# Patient Record
Sex: FEMALE | Race: BLACK OR AFRICAN AMERICAN | NOT HISPANIC OR LATINO | Employment: PART TIME | URBAN - METROPOLITAN AREA
[De-identification: names, ages, dates, MRNs, and addresses within clinical notes are randomized per-mention and may not be internally consistent; named-entity substitution may affect disease eponyms.]

---

## 2021-01-13 ENCOUNTER — TELEPHONE (OUTPATIENT)
Dept: URGENT CARE | Facility: CLINIC | Age: 41
End: 2021-01-13

## 2021-01-13 ENCOUNTER — OFFICE VISIT (OUTPATIENT)
Dept: URGENT CARE | Facility: CLINIC | Age: 41
End: 2021-01-13
Payer: MEDICAID

## 2021-01-13 VITALS
HEART RATE: 82 BPM | WEIGHT: 257 LBS | RESPIRATION RATE: 18 BRPM | TEMPERATURE: 98 F | HEIGHT: 65 IN | SYSTOLIC BLOOD PRESSURE: 145 MMHG | OXYGEN SATURATION: 98 % | BODY MASS INDEX: 42.82 KG/M2 | DIASTOLIC BLOOD PRESSURE: 82 MMHG

## 2021-01-13 DIAGNOSIS — R52 BODY ACHES: ICD-10-CM

## 2021-01-13 DIAGNOSIS — M54.50 ACUTE BILATERAL LOW BACK PAIN WITHOUT SCIATICA: Primary | ICD-10-CM

## 2021-01-13 DIAGNOSIS — R82.71 BACTERIURIA: ICD-10-CM

## 2021-01-13 LAB
BILIRUB UR QL STRIP: NEGATIVE
CTP QC/QA: YES
GLUCOSE UR QL STRIP: NEGATIVE
KETONES UR QL STRIP: POSITIVE
LEUKOCYTE ESTERASE UR QL STRIP: POSITIVE
PH, POC UA: 5.5 (ref 5–8)
POC BLOOD, URINE: NEGATIVE
POC NITRATES, URINE: NEGATIVE
PROT UR QL STRIP: NEGATIVE
SARS-COV-2 RDRP RESP QL NAA+PROBE: NEGATIVE
SP GR UR STRIP: 1.03 (ref 1–1.03)
UROBILINOGEN UR STRIP-ACNC: ABNORMAL (ref 0.1–1.1)

## 2021-01-13 PROCEDURE — 87086 URINE CULTURE/COLONY COUNT: CPT

## 2021-01-13 PROCEDURE — 81003 URINALYSIS AUTO W/O SCOPE: CPT | Mod: QW,S$GLB,, | Performed by: NURSE PRACTITIONER

## 2021-01-13 PROCEDURE — U0002: ICD-10-PCS | Mod: QW,S$GLB,, | Performed by: NURSE PRACTITIONER

## 2021-01-13 PROCEDURE — 99203 PR OFFICE/OUTPT VISIT, NEW, LEVL III, 30-44 MIN: ICD-10-PCS | Mod: 25,S$GLB,CS, | Performed by: NURSE PRACTITIONER

## 2021-01-13 PROCEDURE — 99203 OFFICE O/P NEW LOW 30 MIN: CPT | Mod: 25,S$GLB,CS, | Performed by: NURSE PRACTITIONER

## 2021-01-13 PROCEDURE — U0002 COVID-19 LAB TEST NON-CDC: HCPCS | Mod: QW,S$GLB,, | Performed by: NURSE PRACTITIONER

## 2021-01-13 PROCEDURE — 81003 POCT URINALYSIS, DIPSTICK, AUTOMATED, W/O SCOPE: ICD-10-PCS | Mod: QW,S$GLB,, | Performed by: NURSE PRACTITIONER

## 2021-01-13 RX ORDER — METFORMIN HYDROCHLORIDE 1000 MG/1
1000 TABLET, FILM COATED, EXTENDED RELEASE ORAL
COMMUNITY
End: 2022-05-28

## 2021-01-13 RX ORDER — KETOROLAC TROMETHAMINE 30 MG/ML
30 INJECTION, SOLUTION INTRAMUSCULAR; INTRAVENOUS
Status: COMPLETED | OUTPATIENT
Start: 2021-01-13 | End: 2021-01-13

## 2021-01-13 RX ORDER — NAPROXEN 500 MG/1
500 TABLET ORAL 2 TIMES DAILY WITH MEALS
Qty: 20 TABLET | Refills: 0 | Status: SHIPPED | OUTPATIENT
Start: 2021-01-13 | End: 2021-01-23

## 2021-01-13 RX ADMIN — KETOROLAC TROMETHAMINE 30 MG: 30 INJECTION, SOLUTION INTRAMUSCULAR; INTRAVENOUS at 02:01

## 2021-01-15 LAB — BACTERIA UR CULT: NO GROWTH

## 2022-03-31 ENCOUNTER — HOSPITAL ENCOUNTER (EMERGENCY)
Facility: HOSPITAL | Age: 42
Discharge: HOME | End: 2022-03-31
Attending: EMERGENCY MEDICINE
Payer: MEDICAID

## 2022-03-31 VITALS
SYSTOLIC BLOOD PRESSURE: 127 MMHG | RESPIRATION RATE: 12 BRPM | WEIGHT: 193.69 LBS | TEMPERATURE: 98 F | BODY MASS INDEX: 32.27 KG/M2 | HEIGHT: 65 IN | DIASTOLIC BLOOD PRESSURE: 60 MMHG | HEART RATE: 65 BPM | OXYGEN SATURATION: 100 %

## 2022-03-31 DIAGNOSIS — S16.1XXA STRAIN OF NECK MUSCLE, INITIAL ENCOUNTER: ICD-10-CM

## 2022-03-31 DIAGNOSIS — M62.838 MUSCLE SPASMS OF NECK: ICD-10-CM

## 2022-03-31 DIAGNOSIS — M54.2 NECK PAIN: Primary | ICD-10-CM

## 2022-03-31 PROCEDURE — 96372 THER/PROPH/DIAG INJ SC/IM: CPT | Performed by: EMERGENCY MEDICINE

## 2022-03-31 PROCEDURE — 63600175 PHARM REV CODE 636 W HCPCS: Performed by: EMERGENCY MEDICINE

## 2022-03-31 PROCEDURE — 99284 EMERGENCY DEPT VISIT MOD MDM: CPT | Mod: 25

## 2022-03-31 RX ORDER — OXYCODONE AND ACETAMINOPHEN 5; 325 MG/1; MG/1
1 TABLET ORAL EVERY 4 HOURS PRN
Qty: 20 TABLET | Refills: 0 | Status: SHIPPED | OUTPATIENT
Start: 2022-03-31 | End: 2022-04-05

## 2022-03-31 RX ORDER — KETOROLAC TROMETHAMINE 30 MG/ML
15 INJECTION, SOLUTION INTRAMUSCULAR; INTRAVENOUS
Status: COMPLETED | OUTPATIENT
Start: 2022-03-31 | End: 2022-03-31

## 2022-03-31 RX ORDER — OXYCODONE AND ACETAMINOPHEN 5; 325 MG/1; MG/1
1 TABLET ORAL EVERY 4 HOURS PRN
Qty: 20 TABLET | Refills: 0 | Status: SHIPPED | OUTPATIENT
Start: 2022-03-31 | End: 2022-03-31 | Stop reason: SDUPTHER

## 2022-03-31 RX ORDER — ORPHENADRINE CITRATE 30 MG/ML
60 INJECTION INTRAMUSCULAR; INTRAVENOUS
Status: COMPLETED | OUTPATIENT
Start: 2022-03-31 | End: 2022-03-31

## 2022-03-31 RX ADMIN — ORPHENADRINE CITRATE 60 MG: 30 INJECTION INTRAMUSCULAR; INTRAVENOUS at 07:03

## 2022-03-31 RX ADMIN — KETOROLAC TROMETHAMINE 15 MG: 30 INJECTION, SOLUTION INTRAMUSCULAR at 07:03

## 2022-03-31 NOTE — Clinical Note
"Thomas Mcclain"Jose A was seen and treated in our emergency department on 3/31/2022.  She may return to work on 04/01/2022.       If you have any questions or concerns, please don't hesitate to call.      Mahad Silva MD"

## 2022-03-31 NOTE — ED PROVIDER NOTES
SCRIBE #1 NOTE: I, Mamadou Perez, am scribing for, and in the presence of, Mahad Silva MD. I have scribed the entire note.      History     Chief Complaint   Patient presents with    Neck Pain     C/o neck/upper shoulder billat/upper back pain x's 2 weeks and states wants eval bc it is getting worse.      Review of patient's allergies indicates:   Allergen Reactions    Fioricet [butalbital-acetaminophen-caff] Other (See Comments)         History of Present Illness     HPI    3/31/2022, 6:23 AM  History obtained from the patient      History of Present Illness: Thomas Naranjo is a 42 y.o. female patient who presents to the Emergency Department for evaluation of neck pain which onset gradually 2 weeks ago. Pt reports that she has been experiencing bilateral shoulder and upper back pain in addition to neck pain. Her symptoms have been worsening since onset. Symptoms are constant and moderate in severity. No mitigating or exacerbating factors reported. No associated symptoms reported. Patient denies any fever, chills, N/V, abdominal pain, dizziness, weakness, numbness, and all other sxs at this time. No prior Tx reported. No further complaints or concerns at this time.      Arrival mode: Personal vehicle    PCP: Aliza Carbajal MD        Past Medical History:  Past Medical History:   Diagnosis Date    Herpes genitalia     Migraine headache     PCOS (polycystic ovarian syndrome)     Shingles        Past Surgical History:  Past Surgical History:   Procedure Laterality Date    ADENOIDECTOMY      FOOT SURGERY      TONSILLECTOMY           Family History:  Family History   Problem Relation Age of Onset    Colon cancer Neg Hx     Ovarian cancer Neg Hx     Breast cancer Other        Social History:  Social History     Tobacco Use    Smoking status: Never Smoker    Smokeless tobacco: Never Used   Substance and Sexual Activity    Alcohol use: Yes     Comment: occasionally    Drug use: No    Sexual  activity: Not on file        Review of Systems     Review of Systems   Constitutional: Negative for fever.   HENT: Negative for congestion and sore throat.    Respiratory: Negative for cough and shortness of breath.    Cardiovascular: Negative for chest pain.   Gastrointestinal: Negative for abdominal pain, diarrhea, nausea and vomiting.   Genitourinary: Negative for dysuria.   Musculoskeletal: Positive for back pain (upper) and neck pain.        (+) bilateral shoulder pain   Skin: Negative for rash.   Neurological: Negative for dizziness, weakness and numbness.   Hematological: Does not bruise/bleed easily.   All other systems reviewed and are negative.     Physical Exam     Initial Vitals [03/31/22 0536]   BP Pulse Resp Temp SpO2   127/60 65 12 98.1 °F (36.7 °C) 100 %      MAP       --          Physical Exam  Nursing Notes and Vital Signs Reviewed.  Constitutional: Patient is in no acute distress. Well-developed and well-nourished.  Head: Atraumatic. Normocephalic.  Eyes: EOM intact. Conjunctivae are not pale. No scleral icterus.  ENT: Mucous membranes are moist. Oropharynx is clear and symmetric.    Neck: Supple. Full ROM. No lymphadenopathy.  Cardiovascular: Regular rate. Regular rhythm. No murmurs, rubs, or gallops. Distal pulses are 2+ and symmetric.  Pulmonary/Chest: No respiratory distress. Clear to auscultation bilaterally. No wheezing or rales.  Abdominal: Soft and non-distended.  There is no tenderness.  No rebound, guarding, or rigidity.  Musculoskeletal: Moves all extremities. No obvious deformities. No edema.  Skin: Warm and dry.  Neurological:  Alert, awake, and appropriate.  Normal speech.  No acute focal neurological deficits are appreciated.  Psychiatric: Normal affect. Good eye contact. Appropriate in content.     ED Course   Procedures  ED Vital Signs:  Vitals:    03/31/22 0536   BP: 127/60   Pulse: 65   Resp: 12   Temp: 98.1 °F (36.7 °C)   TempSrc: Oral   SpO2: 100%   Weight: 87.9 kg (193 lb  "10.8 oz)   Height: 5' 5" (1.651 m)     Imaging Results:  Imaging Results          X-Ray Thoracic Spine AP Lateral (Final result)  Result time 03/31/22 08:55:42    Final result by Asael Taylor MD (03/31/22 08:55:42)                 Impression:      Minimal thoracic scoliosis, convex to the left, likely positional.  Otherwise negative thoracic spine exam.      Electronically signed by: Asael Taylor  Date:    03/31/2022  Time:    08:55             Narrative:    EXAMINATION:  XR THORACIC SPINE AP LATERAL    CLINICAL HISTORY:  Pain Thoracic Spine (724.1);    COMPARISON:  None    FINDINGS:  Minimal thoracic scoliosis, convex to the left which may be positional.All thoracic vertebral bodies are normal in height.No significant degenerative changes.Paravertebral soft tissues are normal.                               X-Ray Cervical Spine AP And Lateral (Final result)  Result time 03/31/22 08:57:59    Final result by Asael Taylor MD (03/31/22 08:57:59)                 Impression:      1. Minimal anterior subluxation C4-C5.  2. Minimal disc space narrowing C4-5 and C5-6.      Electronically signed by: Asael Taylor  Date:    03/31/2022  Time:    08:57             Narrative:    EXAMINATION:  XR CERVICAL SPINE AP LATERAL    CLINICAL HISTORY:  Pain cervical (723.1);    COMPARISON:  None.    FINDINGS:  Tip of the odontoid obscured by overlapping structures.  1.2 mm anterior subluxation C4-C5.  Otherwise alignment normal otherwise the alignment is normal.  All cervical vertebral bodies are normal in height.  Minimal disc space narrowing C4-5 and C5-6.  Prevertebral soft tissues are normal.                                      The Emergency Provider reviewed the vital signs and test results, which are outlined above.     ED Discussion     9:09 AM: Reassessed pt at this time. Discussed with pt all pertinent ED information and results. Discussed pt dx and plan of tx. Gave pt all f/u and return to the ED " instructions. All questions and concerns were addressed at this time. Pt expresses understanding of information and instructions, and is comfortable with plan to discharge. Pt is stable for discharge.    I discussed with patient and/or family/caretaker that evaluation in the ED does not suggest any emergent or life threatening medical conditions requiring immediate intervention beyond what was provided in the ED, and I believe patient is safe for discharge.  Regardless, an unremarkable evaluation in the ED does not preclude the development or presence of a serious of life threatening condition. As such, patient was instructed to return immediately for any worsening or change in current symptoms.       Medical Decision Making:   Clinical Tests:   Radiological Study: Ordered and Reviewed           ED Medication(s):  Medications   ketorolac injection 15 mg (15 mg Intramuscular Given 3/31/22 0716)   orphenadrine injection 60 mg (60 mg Intramuscular Given 3/31/22 0711)       New Prescriptions    OXYCODONE-ACETAMINOPHEN (PERCOCET) 5-325 MG PER TABLET    Take 1 tablet by mouth every 4 (four) hours as needed for Pain.        Follow-up Information     Schedule an appointment as soon as possible for a visit  with The [address]    Specialty: Internal Medicine  Contact information:  [address] [facility] [address] [phone]  Additional information:  Please park on the [address] side and use the Clinic entrance. Check in on the 2nd floor, to the right.                           Scribe Attestation:   Scribe #1: I performed the above scribed service and the documentation accurately describes the services I performed. I attest to the accuracy of the note.     Attending:   Physician Attestation Statement for Scribe #1: I, Mahad Silva MD, personally performed the services described in this documentation, as scribed by Mamadou Perez, in my presence, and it is both accurate and  complete.          Clinical Impression       ICD-10-CM ICD-9-CM   1. Neck pain  M54.2 723.1   2. Strain of neck muscle, initial encounter  S16.1XXA 847.0   3. Muscle spasms of neck  M62.838 728.85       Disposition:   Disposition: Discharged  Condition: Stable       Mahad Silva MD  03/31/22 0959

## 2022-04-07 ENCOUNTER — TELEPHONE (OUTPATIENT)
Dept: ORTHOPEDICS | Facility: CLINIC | Age: 42
End: 2022-04-07
Payer: MEDICAID

## 2022-04-07 NOTE — TELEPHONE ENCOUNTER
----- Message from Mary Umana LPN sent at 4/7/2022  2:53 PM CDT -----  Contact: Thomas    ----- Message -----  From: Gaye Keating  Sent: 4/7/2022   2:35 PM CDT  To: [facility] Clinical [facility]    Pt was seen in ER and was advised to follow up in clinic for spine. Please call her back at [phone].            Thanks  DD

## 2022-04-07 NOTE — TELEPHONE ENCOUNTER
Spoke with patient and informed her that our Ortho Department does not treat the neck and back and that [facility] is not accepting new Medicaid patient's at this time. Understanding verbalized.

## 2022-04-11 ENCOUNTER — OFFICE VISIT (OUTPATIENT)
Dept: PRIMARY CARE CLINIC | Facility: CLINIC | Age: 42
End: 2022-04-11
Payer: MEDICAID

## 2022-04-11 VITALS
SYSTOLIC BLOOD PRESSURE: 120 MMHG | HEART RATE: 77 BPM | DIASTOLIC BLOOD PRESSURE: 65 MMHG | WEIGHT: 201 LBS | OXYGEN SATURATION: 100 % | TEMPERATURE: 98 F | BODY MASS INDEX: 33.49 KG/M2 | HEIGHT: 65 IN

## 2022-04-11 DIAGNOSIS — M54.2 NECK PAIN: Primary | ICD-10-CM

## 2022-04-11 DIAGNOSIS — M79.602 LEFT ARM PAIN: ICD-10-CM

## 2022-04-11 PROCEDURE — 1159F MED LIST DOCD IN RCRD: CPT | Mod: CPTII,,, | Performed by: FAMILY MEDICINE

## 2022-04-11 PROCEDURE — 99999 PR PBB SHADOW E&M-EST. PATIENT-LVL V: ICD-10-PCS | Mod: PBBFAC,,, | Performed by: FAMILY MEDICINE

## 2022-04-11 PROCEDURE — 99203 PR OFFICE/OUTPT VISIT, NEW, LEVL III, 30-44 MIN: ICD-10-PCS | Mod: S$PBB,,, | Performed by: FAMILY MEDICINE

## 2022-04-11 PROCEDURE — 99999 PR PBB SHADOW E&M-EST. PATIENT-LVL V: CPT | Mod: PBBFAC,,, | Performed by: FAMILY MEDICINE

## 2022-04-11 PROCEDURE — 3074F SYST BP LT 130 MM HG: CPT | Mod: CPTII,,, | Performed by: FAMILY MEDICINE

## 2022-04-11 PROCEDURE — 1159F PR MEDICATION LIST DOCUMENTED IN MEDICAL RECORD: ICD-10-PCS | Mod: CPTII,,, | Performed by: FAMILY MEDICINE

## 2022-04-11 PROCEDURE — 99215 OFFICE O/P EST HI 40 MIN: CPT | Mod: PBBFAC,PN | Performed by: FAMILY MEDICINE

## 2022-04-11 PROCEDURE — 3074F PR MOST RECENT SYSTOLIC BLOOD PRESSURE < 130 MM HG: ICD-10-PCS | Mod: CPTII,,, | Performed by: FAMILY MEDICINE

## 2022-04-11 PROCEDURE — 99203 OFFICE O/P NEW LOW 30 MIN: CPT | Mod: S$PBB,,, | Performed by: FAMILY MEDICINE

## 2022-04-11 PROCEDURE — 3078F DIAST BP <80 MM HG: CPT | Mod: CPTII,,, | Performed by: FAMILY MEDICINE

## 2022-04-11 PROCEDURE — 3078F PR MOST RECENT DIASTOLIC BLOOD PRESSURE < 80 MM HG: ICD-10-PCS | Mod: CPTII,,, | Performed by: FAMILY MEDICINE

## 2022-04-11 PROCEDURE — 3008F PR BODY MASS INDEX (BMI) DOCUMENTED: ICD-10-PCS | Mod: CPTII,,, | Performed by: FAMILY MEDICINE

## 2022-04-11 PROCEDURE — 3008F BODY MASS INDEX DOCD: CPT | Mod: CPTII,,, | Performed by: FAMILY MEDICINE

## 2022-04-11 NOTE — PROGRESS NOTES
Subjective:       Patient ID: Thomas Naranjo is a 42 y.o. female.    Chief Complaint: Left neck/ arm pain (requesting referral))      HPI   History of Present Illness:   Thomas Naranjo 42 y.o. female presents today with   She has PCP in [place], but is here to work, was seen in ER 3/31 for neck and left arm pain.  42 y.o. female patient who presents to the Emergency Department for evaluation of neck pain which onset gradually 2 weeks ago. Pt reports that she has been experiencing bilateral shoulder and upper back pain in addition to neck pain. Her symptoms have been worsening since onset. Symptoms are constant and moderate in severity. No mitigating or exacerbating factors reported. No associated symptoms reported. Patient denies any fever, chills, N/V, abdominal pain, dizziness, weakness, numbness, and all other sxs at this time. Xray of the cervix and thoracic have been reviewed-mild osteoarthritis and scoliosis.    She has tried to see ortho but unable to get in due to insurance. Here today to get referral to [facility]. She is due to start PT on 04/13 as ordered by ER.   She is not interested in shoulder or neck injections.   Past Medical History:   Diagnosis Date    Herpes genitalia     Migraine headache     PCOS (polycystic ovarian syndrome)     Shingles      Family History   Problem Relation Age of Onset    Colon cancer Neg Hx     Ovarian cancer Neg Hx     Breast cancer Other      Social History     Socioeconomic History    Marital status: Single   Tobacco Use    Smoking status: Never Smoker    Smokeless tobacco: Never Used   Substance and Sexual Activity    Alcohol use: Yes     Comment: occasionally    Drug use: No     Outpatient Encounter Medications as of 4/11/2022   Medication Sig Dispense Refill    ondansetron (ZOFRAN-ODT) 4 MG TbDL Take 1 tablet (4 mg total) by mouth every 8 (eight) hours as needed (nausea/vomiting). 9 tablet 0    acyclovir 5% (ZOVIRAX) 5 % ointment Apply topically 6 (six)  "times daily. (Patient not taking: Reported on 4/11/2022) 30 g 0    desogestrel-ethinyl estradiol (APRI) 0.15-30 mg-mcg per tablet Take 1 tablet by mouth once daily. (Patient not taking: Reported on 4/11/2022) 28 tablet 11    imiquimod (ALDARA) 5 % cream Apply topically 3 (three) times a week. (Patient not taking: Reported on 4/11/2022) 24 packet 1    metFORMIN (GLUMETZA) 1000 MG (MOD) 24hr tablet Take 1,000 mg by mouth daily with breakfast.      spironolactone-hydrochlorothiazide (ALDACTAZIDE) 50-50 mg per tablet Take 1 tablet by mouth once daily.      tramadol (ULTRAM) 50 mg tablet Take 1 tablet (50 mg total) by mouth every 6 (six) hours as needed for Pain. (Patient not taking: Reported on 1/13/2021) 12 tablet 0    TROKENDI XR 50 mg Cp24 Take 1 tablet by mouth daily as needed.        No facility-administered encounter medications on file as of 4/11/2022.       Review of Systems   Constitutional: Negative for chills and fever.   HENT: Negative for congestion and facial swelling.    Eyes: Negative for discharge and itching.   Respiratory: Negative for cough and wheezing.    Cardiovascular: Negative for chest pain and palpitations.   Gastrointestinal: Negative for abdominal pain, nausea and vomiting.   Endocrine: Negative for cold intolerance and heat intolerance.   Genitourinary: Negative for dysuria and flank pain.   Musculoskeletal: Negative for myalgias and neck stiffness.   Skin: Negative for pallor and wound.   Neurological: Negative for facial asymmetry and weakness.   Psychiatric/Behavioral: Negative for agitation and suicidal ideas.       Objective:      /65 (BP Location: Right arm, Patient Position: Sitting, BP Method: Medium (Automatic))   Pulse 77   Temp 97.9 °F (36.6 °C) (Temporal)   Ht 5' 5" (1.651 m)   Wt 91.2 kg (201 lb)   SpO2 100%   BMI 33.45 kg/m²   Physical Exam    CONSTITUTIONAL: No apparent distress.   CARDIOVASCULAR: No perioral cyanosis  PULMONARY: Breathing unlabored. No " retractions Chest expansion grossly normal.  PSYCHIATRIC: Alert and conversant and grossly oriented. Mood is grossly neutral. Affect appropriate.   NEUROLOGIC: No focal sensory deficits reported.   Results for orders placed or performed in visit on 01/13/21   Urine culture    Specimen: Urine, Clean Catch   Result Value Ref Range    Urine Culture, Routine No growth    POCT COVID-19 Rapid Screening   Result Value Ref Range    POC Rapid COVID Negative Negative     Acceptable Yes    POCT Urinalysis, Dipstick, Automated, W/O Scope   Result Value Ref Range    POC Blood, Urine Negative Negative    POC Bilirubin, Urine Negative Negative    POC Urobilinogen, Urine norm 0.1 - 1.1    POC Ketones, Urine Positive (A) Negative    POC Protein, Urine Negative Negative    POC Nitrates, Urine Negative Negative    POC Glucose, Urine Negative Negative    pH, UA 5.5 5 - 8    POC Specific Gravity, Urine 1.030 (A) 1.003 - 1.029    POC Leukocytes, Urine Positive (A) Negative     Assessment:       1. Neck pain    2. Left arm pain        Plan:   Neck pain  -     Ambulatory referral/consult to Orthopedics; Future; Expected date: 04/18/2022    Left arm pain  -     Ambulatory referral/consult to Orthopedics; Future; Expected date: 04/18/2022      Reassured, No urgent need per xray and sxs has not worsened. Advised to continue med as ordered and start PT while waiting for her referral to come through and if any sxs, ok to RTC clinic. Ortho referral as requested.  Sachi Wiggins MD

## 2022-05-28 ENCOUNTER — OFFICE VISIT (OUTPATIENT)
Dept: URGENT CARE | Facility: CLINIC | Age: 42
End: 2022-05-28
Payer: MEDICAID

## 2022-05-28 VITALS
OXYGEN SATURATION: 97 % | SYSTOLIC BLOOD PRESSURE: 122 MMHG | BODY MASS INDEX: 33.49 KG/M2 | DIASTOLIC BLOOD PRESSURE: 80 MMHG | HEART RATE: 97 BPM | HEIGHT: 65 IN | RESPIRATION RATE: 17 BRPM | WEIGHT: 201 LBS | TEMPERATURE: 101 F

## 2022-05-28 DIAGNOSIS — U07.1 COVID-19: Primary | ICD-10-CM

## 2022-05-28 DIAGNOSIS — Z11.52 ENCOUNTER FOR SCREENING LABORATORY TESTING FOR COVID-19 VIRUS: ICD-10-CM

## 2022-05-28 DIAGNOSIS — R05.9 COUGH: ICD-10-CM

## 2022-05-28 LAB
CTP QC/QA: YES
CTP QC/QA: YES
POC MOLECULAR INFLUENZA A AGN: NEGATIVE
POC MOLECULAR INFLUENZA B AGN: NEGATIVE
SARS-COV-2 RDRP RESP QL NAA+PROBE: POSITIVE

## 2022-05-28 PROCEDURE — 3079F DIAST BP 80-89 MM HG: CPT | Mod: CPTII,S$GLB,,

## 2022-05-28 PROCEDURE — 3074F SYST BP LT 130 MM HG: CPT | Mod: CPTII,S$GLB,,

## 2022-05-28 PROCEDURE — 87502 INFLUENZA DNA AMP PROBE: CPT | Mod: QW,S$GLB,,

## 2022-05-28 PROCEDURE — 99213 OFFICE O/P EST LOW 20 MIN: CPT | Mod: S$GLB,,,

## 2022-05-28 PROCEDURE — 3074F PR MOST RECENT SYSTOLIC BLOOD PRESSURE < 130 MM HG: ICD-10-PCS | Mod: CPTII,S$GLB,,

## 2022-05-28 PROCEDURE — 1159F PR MEDICATION LIST DOCUMENTED IN MEDICAL RECORD: ICD-10-PCS | Mod: CPTII,S$GLB,,

## 2022-05-28 PROCEDURE — 1160F PR REVIEW ALL MEDS BY PRESCRIBER/CLIN PHARMACIST DOCUMENTED: ICD-10-PCS | Mod: CPTII,S$GLB,,

## 2022-05-28 PROCEDURE — U0002 COVID-19 LAB TEST NON-CDC: HCPCS | Mod: QW,S$GLB,,

## 2022-05-28 PROCEDURE — 99213 PR OFFICE/OUTPT VISIT, EST, LEVL III, 20-29 MIN: ICD-10-PCS | Mod: S$GLB,,,

## 2022-05-28 PROCEDURE — 3079F PR MOST RECENT DIASTOLIC BLOOD PRESSURE 80-89 MM HG: ICD-10-PCS | Mod: CPTII,S$GLB,,

## 2022-05-28 PROCEDURE — 3008F BODY MASS INDEX DOCD: CPT | Mod: CPTII,S$GLB,,

## 2022-05-28 PROCEDURE — 3008F PR BODY MASS INDEX (BMI) DOCUMENTED: ICD-10-PCS | Mod: CPTII,S$GLB,,

## 2022-05-28 PROCEDURE — U0002: ICD-10-PCS | Mod: QW,S$GLB,,

## 2022-05-28 PROCEDURE — 1159F MED LIST DOCD IN RCRD: CPT | Mod: CPTII,S$GLB,,

## 2022-05-28 PROCEDURE — 1160F RVW MEDS BY RX/DR IN RCRD: CPT | Mod: CPTII,S$GLB,,

## 2022-05-28 PROCEDURE — 87502 POCT INFLUENZA A/B MOLECULAR: ICD-10-PCS | Mod: QW,S$GLB,,

## 2022-05-28 RX ORDER — AZELASTINE 1 MG/ML
SPRAY, METERED NASAL
COMMUNITY
Start: 2021-08-26

## 2022-05-28 RX ORDER — ERGOCALCIFEROL 1.25 MG/1
50000 CAPSULE ORAL
COMMUNITY
Start: 2022-05-25

## 2022-05-28 RX ORDER — PANTOPRAZOLE SODIUM 40 MG/1
40 TABLET, DELAYED RELEASE ORAL 2 TIMES DAILY
COMMUNITY
Start: 2022-05-21

## 2022-05-28 RX ORDER — ACYCLOVIR 800 MG/1
800 TABLET ORAL 2 TIMES DAILY
COMMUNITY
Start: 2022-05-08

## 2022-05-28 RX ORDER — VALACYCLOVIR HYDROCHLORIDE 1 G/1
1000 TABLET, FILM COATED ORAL DAILY
COMMUNITY
Start: 2022-05-22

## 2022-05-28 RX ORDER — HYDROCODONE BITARTRATE AND ACETAMINOPHEN 7.5; 325 MG/15ML; MG/15ML
SOLUTION ORAL
COMMUNITY
Start: 2021-10-06

## 2022-05-28 RX ORDER — PROMETHAZINE HYDROCHLORIDE AND DEXTROMETHORPHAN HYDROBROMIDE 6.25; 15 MG/5ML; MG/5ML
5 SYRUP ORAL NIGHTLY PRN
Qty: 118 ML | Refills: 0 | Status: SHIPPED | OUTPATIENT
Start: 2022-05-28 | End: 2022-06-07

## 2022-05-28 RX ORDER — FAMCICLOVIR 250 MG/1
250 TABLET ORAL 2 TIMES DAILY
COMMUNITY
Start: 2022-05-22

## 2022-05-28 RX ORDER — FLUTICASONE PROPIONATE 50 MCG
SPRAY, SUSPENSION (ML) NASAL
COMMUNITY
Start: 2021-08-26

## 2022-05-28 RX ORDER — CLINDAMYCIN PHOSPHATE 11.9 MG/ML
SOLUTION TOPICAL
COMMUNITY
Start: 2021-08-13

## 2022-05-28 RX ORDER — MULTIVIT WITH IRON,MINERALS
2 TABLET,CHEWABLE ORAL
COMMUNITY

## 2022-05-28 NOTE — PROGRESS NOTES
"Subjective:       Patient ID: Thomas Naranjo is a 42 y.o. female.    Vitals:  height is 5' 5" (1.651 m) and weight is 91.2 kg (201 lb). Her oral temperature is 101 °F (38.3 °C) (abnormal). Her blood pressure is 122/80 and her pulse is 97. Her respiration is 17 and oxygen saturation is 97%.     Chief Complaint: URI    42-year-old female complains of congestion, cough, nausea and body aches has been going on for 2 days.  Patient also has associated fever and chills.  She is taking Tylenol and Cristal-Meacham for symptoms with mild relief.  She denies any chest pain or shortness of breath.  She is vaccinated for COVID with 2 boosters.  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    URI   This is a new problem. Episode onset: 2 days ago. The problem has been gradually worsening. Associated symptoms include congestion, coughing and nausea. Pertinent negatives include no abdominal pain, chest pain, diarrhea, ear pain, sinus pain or vomiting. Associated symptoms comments: Chills, fever. She has tried acetaminophen for the symptoms. The treatment provided mild relief.       Constitution: Positive for chills and fever. Negative for sweating and fatigue.   HENT: Positive for congestion. Negative for ear pain, nosebleeds, foreign body in nose, postnasal drip, sinus pain and sinus pressure.    Cardiovascular: Negative for chest pain and sob on exertion.   Respiratory: Positive for cough. Negative for shortness of breath.    Gastrointestinal: Positive for nausea. Negative for abdominal pain, vomiting, constipation and diarrhea.   Musculoskeletal: Positive for muscle ache.       Objective:      Physical Exam   Constitutional: She is oriented to person, place, and time. She appears well-developed. She is cooperative.  Non-toxic appearance. She does not appear ill. No distress.   HENT:   Head: Normocephalic and atraumatic.   Ears:   Right Ear: Hearing, tympanic membrane, external ear and ear canal normal.   Left Ear: " Hearing, tympanic membrane, external ear and ear canal normal.   Nose: Congestion present. No mucosal edema, rhinorrhea or nasal deformity. No epistaxis. Right sinus exhibits no maxillary sinus tenderness and no frontal sinus tenderness. Left sinus exhibits no maxillary sinus tenderness and no frontal sinus tenderness.   Mouth/Throat: Uvula is midline, oropharynx is clear and moist and mucous membranes are normal. Mucous membranes are moist. No trismus in the jaw. Normal dentition. No uvula swelling. No oropharyngeal exudate or posterior oropharyngeal erythema.   Eyes: Conjunctivae and lids are normal. Right eye exhibits no discharge. Left eye exhibits no discharge. No scleral icterus.   Neck: Trachea normal and phonation normal. Neck supple.   Cardiovascular: Normal rate, regular rhythm, normal heart sounds and normal pulses.   Pulmonary/Chest: Effort normal and breath sounds normal. No stridor. No respiratory distress. She has no wheezes. She has no rhonchi. She has no rales.   Abdominal: Normal appearance and bowel sounds are normal. She exhibits no distension and no mass. Soft. There is no abdominal tenderness.   Musculoskeletal: Normal range of motion.         General: No deformity. Normal range of motion.   Neurological: She is alert and oriented to person, place, and time. She exhibits normal muscle tone. Coordination normal.   Skin: Skin is warm, dry, intact, not diaphoretic and not pale.   Psychiatric: Her speech is normal and behavior is normal. Judgment and thought content normal.   Nursing note and vitals reviewed.        Results for orders placed or performed in visit on 05/28/22   POCT COVID-19 Rapid Screening   Result Value Ref Range    POC Rapid COVID Positive (A) Negative     Acceptable Yes    POCT Influenza A/B MOLECULAR   Result Value Ref Range    POC Molecular Influenza A Ag Negative Negative, Not Reported    POC Molecular Influenza B Ag Negative Negative, Not Reported    Quality  Control Acceptable Yes        Assessment:       1. COVID-19    2. Encounter for screening laboratory testing for COVID-19 virus    3. Cough          Plan:       Risk of complication score:1; no referral needed.     Discussed results with patient and proper quarantine based on CDC guidelines.   Discussed use of OTC medications for symptom control as this is a viral disease.   All ER precautions covered including but not limited to shortness of breath, intractable fever, or chest pain.  Discussed RTC if symptoms worsen, change, or persist.   Patient verbalized understanding and agreed with the plan.     COVID-19  -     promethazine-dextromethorphan (PROMETHAZINE-DM) 6.25-15 mg/5 mL Syrp; Take 5 mLs by mouth nightly as needed (cough). Do not take maximum of 30mLs in 24hrs  Dispense: 118 mL; Refill: 0    Encounter for screening laboratory testing for COVID-19 virus  -     POCT COVID-19 Rapid Screening    Cough  -     POCT Influenza A/B MOLECULAR  -     promethazine-dextromethorphan (PROMETHAZINE-DM) 6.25-15 mg/5 mL Syrp; Take 5 mLs by mouth nightly as needed (cough). Do not take maximum of 30mLs in 24hrs  Dispense: 118 mL; Refill: 0

## 2022-05-28 NOTE — LETTER
[address] [facility], [address] [address]  Phone: [phone]  Fax: [phone]          Return to Work/School    Patient: Thomas Naranjo  YOB: 1980   Date: 05/28/2022     To Whom It May Concern:     Thomas Naranjo was in contact with/seen in my office on 05/28/2022. COVID-19 is present in our communities across the state. There is limited testing for COVID at this time, so not all patients can be tested. In this situation, your employee meets the following criteria:     Thomas Naranjo has met the criteria for COVID-19 testing and has a POSITIVE result. She can return to work once they are asymptomatic for 24 hours without the use of fever reducing medications AND at least five days from the start of symptoms (or from the first positive result if they have no symptoms).      If you have any questions or concerns, or if I can be of further assistance, please do not hesitate to contact me.     Sincerely,    Yenny Galvan PA-C

## 2022-06-08 ENCOUNTER — PATIENT MESSAGE (OUTPATIENT)
Dept: RESEARCH | Facility: HOSPITAL | Age: 42
End: 2022-06-08
Payer: MEDICAID

## 2022-07-13 ENCOUNTER — TELEPHONE (OUTPATIENT)
Dept: ORTHOPEDICS | Facility: CLINIC | Age: 42
End: 2022-07-13
Payer: MEDICAID

## 2023-01-01 ENCOUNTER — OFFICE VISIT (OUTPATIENT)
Dept: URGENT CARE | Facility: CLINIC | Age: 43
End: 2023-01-01
Payer: COMMERCIAL

## 2023-01-01 VITALS
RESPIRATION RATE: 18 BRPM | WEIGHT: 195.38 LBS | DIASTOLIC BLOOD PRESSURE: 81 MMHG | HEART RATE: 98 BPM | TEMPERATURE: 99 F | SYSTOLIC BLOOD PRESSURE: 115 MMHG | HEIGHT: 65 IN | BODY MASS INDEX: 32.55 KG/M2 | OXYGEN SATURATION: 99 %

## 2023-01-01 DIAGNOSIS — J10.1 INFLUENZA A: Primary | ICD-10-CM

## 2023-01-01 LAB
CTP QC/QA: YES
CTP QC/QA: YES
POC MOLECULAR INFLUENZA A AGN: POSITIVE
POC MOLECULAR INFLUENZA B AGN: NEGATIVE
SARS-COV-2 AG RESP QL IA.RAPID: NEGATIVE

## 2023-01-01 PROCEDURE — 1160F RVW MEDS BY RX/DR IN RCRD: CPT | Mod: CPTII,S$GLB,,

## 2023-01-01 PROCEDURE — 3008F BODY MASS INDEX DOCD: CPT | Mod: CPTII,S$GLB,,

## 2023-01-01 PROCEDURE — 3079F PR MOST RECENT DIASTOLIC BLOOD PRESSURE 80-89 MM HG: ICD-10-PCS | Mod: CPTII,S$GLB,,

## 2023-01-01 PROCEDURE — 3074F SYST BP LT 130 MM HG: CPT | Mod: CPTII,S$GLB,,

## 2023-01-01 PROCEDURE — 87502 INFLUENZA DNA AMP PROBE: CPT | Mod: QW,S$GLB,,

## 2023-01-01 PROCEDURE — 3008F PR BODY MASS INDEX (BMI) DOCUMENTED: ICD-10-PCS | Mod: CPTII,S$GLB,,

## 2023-01-01 PROCEDURE — 1159F PR MEDICATION LIST DOCUMENTED IN MEDICAL RECORD: ICD-10-PCS | Mod: CPTII,S$GLB,,

## 2023-01-01 PROCEDURE — 1159F MED LIST DOCD IN RCRD: CPT | Mod: CPTII,S$GLB,,

## 2023-01-01 PROCEDURE — 99213 PR OFFICE/OUTPT VISIT, EST, LEVL III, 20-29 MIN: ICD-10-PCS | Mod: S$GLB,,,

## 2023-01-01 PROCEDURE — 87502 POCT INFLUENZA A/B MOLECULAR: ICD-10-PCS | Mod: QW,S$GLB,,

## 2023-01-01 PROCEDURE — 99213 OFFICE O/P EST LOW 20 MIN: CPT | Mod: S$GLB,,,

## 2023-01-01 PROCEDURE — 1160F PR REVIEW ALL MEDS BY PRESCRIBER/CLIN PHARMACIST DOCUMENTED: ICD-10-PCS | Mod: CPTII,S$GLB,,

## 2023-01-01 PROCEDURE — 3079F DIAST BP 80-89 MM HG: CPT | Mod: CPTII,S$GLB,,

## 2023-01-01 PROCEDURE — 3074F PR MOST RECENT SYSTOLIC BLOOD PRESSURE < 130 MM HG: ICD-10-PCS | Mod: CPTII,S$GLB,,

## 2023-01-01 PROCEDURE — U0002 SARS CORONAVIRUS 2 ANTIGEN POCT, MANUAL READ: ICD-10-PCS | Mod: QW,S$GLB,,

## 2023-01-01 PROCEDURE — U0002 COVID-19 LAB TEST NON-CDC: HCPCS | Mod: QW,S$GLB,,

## 2023-01-01 RX ORDER — OSELTAMIVIR PHOSPHATE 75 MG/1
75 CAPSULE ORAL 2 TIMES DAILY
Qty: 10 CAPSULE | Refills: 0 | Status: SHIPPED | OUTPATIENT
Start: 2023-01-01 | End: 2023-01-06

## 2023-01-01 NOTE — PATIENT INSTRUCTIONS

## 2023-01-01 NOTE — PROGRESS NOTES
"Subjective:       Patient ID: Thomas Naranjo is a 42 y.o. female.    Vitals:  height is 5' 5" (1.651 m) and weight is 88.6 kg (195 lb 6.4 oz). Her oral temperature is 98.7 °F (37.1 °C). Her blood pressure is 115/81 and her pulse is 98. Her respiration is 18 and oxygen saturation is 99%.     Chief Complaint: Cough    Pt is a 41 y/o F who presents with coughing, sore throat, nasal congestion x3 days. Denies fever, chills, CP, SoB, N/V/D, abdominal pain, dizziness.    Cough  This is a new problem. Episode onset: 3 days ago. The problem has been gradually worsening. The cough is Non-productive. Associated symptoms include postnasal drip and a sore throat. Pertinent negatives include no chest pain, chills, ear congestion, ear pain, fever, headaches, heartburn, hemoptysis, myalgias, nasal congestion, rash, rhinorrhea, shortness of breath, sweats, weight loss or wheezing. Nothing aggravates the symptoms. Treatments tried: Extra strenght Tylenol. The treatment provided mild relief. Her past medical history is significant for bronchitis and pneumonia. There is no history of asthma, bronchiectasis, COPD, emphysema or environmental allergies.     Constitution: Negative for chills and fever.   HENT:  Positive for congestion, postnasal drip and sore throat. Negative for ear pain and ear discharge.    Neck: Negative for neck pain and neck stiffness.   Cardiovascular:  Negative for chest pain.   Respiratory:  Positive for cough. Negative for bloody sputum, shortness of breath and wheezing.    Gastrointestinal:  Negative for abdominal pain, nausea, vomiting, constipation, diarrhea and heartburn.   Genitourinary:  Negative for dysuria.   Musculoskeletal:  Negative for muscle ache.   Skin:  Negative for rash.   Allergic/Immunologic: Negative for environmental allergies and sneezing.   Neurological:  Negative for dizziness and headaches.     Objective:      Physical Exam   Constitutional: She is oriented to person, place, and time. " She appears well-developed.   HENT:   Head: Normocephalic and atraumatic.   Ears:   Right Ear: Tympanic membrane, external ear and ear canal normal.   Left Ear: Tympanic membrane, external ear and ear canal normal.   Nose: Congestion present.   Mouth/Throat: Oropharynx is clear and moist.   Eyes: Conjunctivae, EOM and lids are normal. Pupils are equal, round, and reactive to light.   Neck: Trachea normal and phonation normal. Neck supple.   Cardiovascular: Normal rate, regular rhythm, normal heart sounds and normal pulses.   Pulmonary/Chest: Effort normal and breath sounds normal. No respiratory distress.   Musculoskeletal: Normal range of motion.         General: Normal range of motion.   Neurological: no focal deficit. She is alert and oriented to person, place, and time. No cranial nerve deficit.   Skin: Skin is warm, dry and intact. Capillary refill takes less than 2 seconds.   Psychiatric: Her speech is normal and behavior is normal. Judgment and thought content normal.   Nursing note and vitals reviewed.      Results for orders placed or performed in visit on 01/01/23   SARS Coronavirus 2 Antigen, POCT Manual Read   Result Value Ref Range    SARS Coronavirus 2 Antigen Negative Negative     Acceptable Yes    POCT Influenza A/B MOLECULAR   Result Value Ref Range    POC Molecular Influenza A Ag Positive (A) Negative, Not Reported    POC Molecular Influenza B Ag Negative Negative, Not Reported     Acceptable Yes        Assessment:       1. Influenza A          Plan:         Influenza A  -     SARS Coronavirus 2 Antigen, POCT Manual Read  -     POCT Influenza A/B MOLECULAR  -     oseltamivir (TAMIFLU) 75 MG capsule; Take 1 capsule (75 mg total) by mouth 2 (two) times daily. for 5 days  Dispense: 10 capsule; Refill: 0                   Patient Instructions   - Rest.    - Drink plenty of fluids.  - Viral upper respiratory infections typically run their course in 10-14 days.      - You  can take over-the-counter claritin, zyrtec, allegra, or xyzal as directed. These are antihistamines that can help with runny nose, nasal congestion, sneezing, and helps to dry up post-nasal drip, which usually causes sore throat and cough.              - If you do NOT have high blood pressure, you may use a decongestant form (D)  of this medication (ie. Claritin- D, zyrtec-D, allegra-D) or if you do not take the D form, you can take sudafed (pseudoephedrine) over the counter, which is a decongestant. Do NOT take two decongestant (D) medications at the same time (such as mucinex-D and claritin-D or plain sudafed and claritin D)    - If you DO have Hypertension, anxiety, or palpitations, it is safe to take Coricidin HBP for relief of sinus symptoms.     - You can use Flonase (fluticasone) nasal spray as directed for sinus congestion and postnasal drip. This is a steroid nasal spray that works locally over time to decrease the inflammation in your nose/sinuses and help with allergic symptoms. This is not an quick- relief spray like afrin, but it works well if used daily.  Discontinue if you develop nose bleed  - use nasal saline prior to Flonase.  - Use Ocean Spray Nasal Saline 1-3 puffs each nostril every 2-3 hours then blow out onto tissue. This is to irrigate the nasal passage way to clear the sinus openings. Use until sinus problem resolved.     - you can take plain Mucinex (guaifenesin) 1200 mg twice a day to help loosen mucous.      -warm salt water gargles can help with sore throat     - warm tea with honey can help with cough. Honey is a natural cough suppressant.     - Dextromethorphan (DM) is a cough suppressant over the counter (ie. mucinex DM, robitussin, delsym; dayquil/nyquil has DM as well.)        - Follow up with your PCP or specialty clinic as directed in the next 1-2 weeks if not improved or as needed.  You can call [phone] to schedule an appointment with the appropriate provider.       - Go  to the ER if you develop new or worsening symptoms.      - You must understand that you have received an Urgent Care treatment only and that you may be released before all of your medical problems are known or treated.   - You, the patient, will arrange for follow up care as instructed.   - If your condition worsens or fails to improve we recommend that you receive another evaluation at the ER immediately or contact your PCP to discuss your concerns or return here.

## 2023-01-01 NOTE — LETTER
January 1, 2023      [facility]  [address] [facility], [address]  [place] [address]  Phone: [phone]  Fax: [phone]       Patient: Thomas Naranjo   YOB: 1980  Date of Visit: 01/01/2023    To Whom It May Concern:    Lizzeth Naranjo  was at [facility] on 01/01/2023. The patient may return to work on 1/4/23. May return sooner if symptoms improve and fever-free for 24 hours. If you have any questions or concerns, or if I can be of further assistance, please do not hesitate to contact me.    Sincerely,    Jose Cote PA-C

## 2023-07-26 DIAGNOSIS — Z12.31 OTHER SCREENING MAMMOGRAM: ICD-10-CM

## 2023-09-03 ENCOUNTER — HOSPITAL ENCOUNTER (EMERGENCY)
Facility: HOSPITAL | Age: 43
Discharge: HOME | End: 2023-09-03
Attending: EMERGENCY MEDICINE
Payer: COMMERCIAL

## 2023-09-03 VITALS
OXYGEN SATURATION: 100 % | TEMPERATURE: 99 F | DIASTOLIC BLOOD PRESSURE: 66 MMHG | RESPIRATION RATE: 16 BRPM | SYSTOLIC BLOOD PRESSURE: 110 MMHG | HEIGHT: 65 IN | BODY MASS INDEX: 29.65 KG/M2 | WEIGHT: 177.94 LBS | HEART RATE: 72 BPM

## 2023-09-03 DIAGNOSIS — R42 LIGHTHEADED: ICD-10-CM

## 2023-09-03 DIAGNOSIS — R42 DIZZINESS: ICD-10-CM

## 2023-09-03 DIAGNOSIS — E86.0 DEHYDRATION: ICD-10-CM

## 2023-09-03 DIAGNOSIS — D64.9 ANEMIA, UNSPECIFIED TYPE: Primary | ICD-10-CM

## 2023-09-03 LAB
ALBUMIN SERPL BCP-MCNC: 3.6 G/DL (ref 3.5–5.2)
ALP SERPL-CCNC: 50 U/L (ref 55–135)
ALT SERPL W/O P-5'-P-CCNC: 10 U/L (ref 10–44)
ANION GAP SERPL CALC-SCNC: 8 MMOL/L (ref 8–16)
AST SERPL-CCNC: 16 U/L (ref 10–40)
B-HCG UR QL: NEGATIVE
BASOPHILS # BLD AUTO: 0.02 K/UL (ref 0–0.2)
BASOPHILS NFR BLD: 0.5 % (ref 0–1.9)
BILIRUB SERPL-MCNC: 0.7 MG/DL (ref 0.1–1)
BILIRUB UR QL STRIP: NEGATIVE
BNP SERPL-MCNC: <10 PG/ML (ref 0–99)
BUN SERPL-MCNC: 13 MG/DL (ref 6–20)
CALCIUM SERPL-MCNC: 8.6 MG/DL (ref 8.7–10.5)
CHLORIDE SERPL-SCNC: 109 MMOL/L (ref 95–110)
CLARITY UR: CLEAR
CO2 SERPL-SCNC: 25 MMOL/L (ref 23–29)
COLOR UR: YELLOW
CREAT SERPL-MCNC: 1 MG/DL (ref 0.5–1.4)
DIFFERENTIAL METHOD: ABNORMAL
EOSINOPHIL # BLD AUTO: 0.1 K/UL (ref 0–0.5)
EOSINOPHIL NFR BLD: 1.2 % (ref 0–8)
ERYTHROCYTE [DISTWIDTH] IN BLOOD BY AUTOMATED COUNT: 13.2 % (ref 11.5–14.5)
EST. GFR  (NO RACE VARIABLE): >60 ML/MIN/1.73 M^2
GLUCOSE SERPL-MCNC: 87 MG/DL (ref 70–110)
GLUCOSE UR QL STRIP: NEGATIVE
HCT VFR BLD AUTO: 34.3 % (ref 37–48.5)
HGB BLD-MCNC: 10.8 G/DL (ref 12–16)
HGB UR QL STRIP: NEGATIVE
IMM GRANULOCYTES # BLD AUTO: 0.01 K/UL (ref 0–0.04)
IMM GRANULOCYTES NFR BLD AUTO: 0.2 % (ref 0–0.5)
KETONES UR QL STRIP: ABNORMAL
LEUKOCYTE ESTERASE UR QL STRIP: NEGATIVE
LYMPHOCYTES # BLD AUTO: 2 K/UL (ref 1–4.8)
LYMPHOCYTES NFR BLD: 49.1 % (ref 18–48)
MCH RBC QN AUTO: 28.2 PG (ref 27–31)
MCHC RBC AUTO-ENTMCNC: 31.5 G/DL (ref 32–36)
MCV RBC AUTO: 90 FL (ref 82–98)
MONOCYTES # BLD AUTO: 0.3 K/UL (ref 0.3–1)
MONOCYTES NFR BLD: 7.2 % (ref 4–15)
NEUTROPHILS # BLD AUTO: 1.7 K/UL (ref 1.8–7.7)
NEUTROPHILS NFR BLD: 41.8 % (ref 38–73)
NITRITE UR QL STRIP: NEGATIVE
NRBC BLD-RTO: 0 /100 WBC
PH UR STRIP: 6 [PH] (ref 5–8)
PLATELET # BLD AUTO: 318 K/UL (ref 150–450)
PMV BLD AUTO: 11.9 FL (ref 9.2–12.9)
POTASSIUM SERPL-SCNC: 4.4 MMOL/L (ref 3.5–5.1)
PROT SERPL-MCNC: 6.2 G/DL (ref 6–8.4)
PROT UR QL STRIP: NEGATIVE
RBC # BLD AUTO: 3.83 M/UL (ref 4–5.4)
SODIUM SERPL-SCNC: 142 MMOL/L (ref 136–145)
SP GR UR STRIP: 1.02 (ref 1–1.03)
TROPONIN I SERPL DL<=0.01 NG/ML-MCNC: 0.01 NG/ML (ref 0–0.03)
URN SPEC COLLECT METH UR: ABNORMAL
UROBILINOGEN UR STRIP-ACNC: NEGATIVE EU/DL
WBC # BLD AUTO: 4.01 K/UL (ref 3.9–12.7)

## 2023-09-03 PROCEDURE — 93010 ELECTROCARDIOGRAM REPORT: CPT | Mod: ,,, | Performed by: STUDENT IN AN ORGANIZED HEALTH CARE EDUCATION/TRAINING PROGRAM

## 2023-09-03 PROCEDURE — 93005 ELECTROCARDIOGRAM TRACING: CPT

## 2023-09-03 PROCEDURE — 83880 ASSAY OF NATRIURETIC PEPTIDE: CPT | Performed by: EMERGENCY MEDICINE

## 2023-09-03 PROCEDURE — 25000003 PHARM REV CODE 250: Performed by: EMERGENCY MEDICINE

## 2023-09-03 PROCEDURE — 81025 URINE PREGNANCY TEST: CPT | Performed by: EMERGENCY MEDICINE

## 2023-09-03 PROCEDURE — 81003 URINALYSIS AUTO W/O SCOPE: CPT | Performed by: EMERGENCY MEDICINE

## 2023-09-03 PROCEDURE — 93010 EKG 12-LEAD: ICD-10-PCS | Mod: ,,, | Performed by: STUDENT IN AN ORGANIZED HEALTH CARE EDUCATION/TRAINING PROGRAM

## 2023-09-03 PROCEDURE — 99285 EMERGENCY DEPT VISIT HI MDM: CPT | Mod: 25

## 2023-09-03 PROCEDURE — 96360 HYDRATION IV INFUSION INIT: CPT

## 2023-09-03 PROCEDURE — 85025 COMPLETE CBC W/AUTO DIFF WBC: CPT | Performed by: EMERGENCY MEDICINE

## 2023-09-03 PROCEDURE — 80053 COMPREHEN METABOLIC PANEL: CPT | Performed by: EMERGENCY MEDICINE

## 2023-09-03 PROCEDURE — 96361 HYDRATE IV INFUSION ADD-ON: CPT

## 2023-09-03 PROCEDURE — 84484 ASSAY OF TROPONIN QUANT: CPT | Performed by: EMERGENCY MEDICINE

## 2023-09-03 RX ORDER — ONDANSETRON 4 MG/1
4 TABLET, ORALLY DISINTEGRATING ORAL EVERY 6 HOURS PRN
Qty: 30 TABLET | Refills: 0 | Status: SHIPPED | OUTPATIENT
Start: 2023-09-03

## 2023-09-03 RX ORDER — ACETAMINOPHEN 500 MG
1000 TABLET ORAL
Status: DISCONTINUED | OUTPATIENT
Start: 2023-09-03 | End: 2023-09-03 | Stop reason: HOSPADM

## 2023-09-03 RX ADMIN — SODIUM CHLORIDE 1000 ML: 9 INJECTION, SOLUTION INTRAVENOUS at 11:09

## 2023-09-03 NOTE — ED PROVIDER NOTES
"SCRIBE #1 NOTE: I, Yoana Wolfe, am scribing for, and in the presence of, Donny Cummings Jr., MD. I have scribed the entire note.      History      Chief Complaint   Patient presents with    Dizziness     Pt reports dizziness and feeling "sluggish" x1 month. Reports vaginal bleeding x2 weeks, took progesterone tablets that she had at home and bleeding stopped. Hx of anemia. Denies CP, SOB, weakness, numbness or tingling.        Review of patient's allergies indicates:   Allergen Reactions    Fioricet [butalbital-acetaminophen-caff] Other (See Comments)        HPI   HPI    9/3/2023, 11:37 AM   History obtained from the patient      History of Present Illness: Thomas Naranjo is a 43 y.o. female patient with a PMHx of anemia and PCOS and PSHx of gastric sleeve who presents to the Emergency Department for light-headedness which onset 1 month ago. Symptoms are episodic and moderate in severity. No mitigating or exacerbating factors reported. Associated sxs include R ear tinnitus, blurry vision, dizziness, headaches, and vaginal bleeding. Pt reports that she had a normal menstrual period in the beginning of August, but had another 7 days of vaginal bleeding at the end of August, so she took Progesterone and the bleeding stopped 2 days ago. Patient denies any numbness, paresthesias, CP, SOB, cough, congestion, abdominal pain, sore throat, and all other sxs at this time. Pt states that she had IVF a couple of weeks ago which helped with her sxs. Pt repots that she has also been taking amoxicillin for 9 days. No further complaints or concerns at this time.           Arrival mode: Personal vehicle     PCP: Aliza Carbajal MD       Past Medical History:  Past Medical History:   Diagnosis Date    Allergy     Herpes genitalia     Migraine headache     PCOS (polycystic ovarian syndrome)     Shingles        Past Surgical History:  Past Surgical History:   Procedure Laterality Date    ADENOIDECTOMY      FOOT SURGERY      " TONSILLECTOMY           Family History:  Family History   Problem Relation Age of Onset    Colon cancer Neg Hx     Ovarian cancer Neg Hx     Breast cancer Other        Social History:  Social History     Tobacco Use    Smoking status: Never    Smokeless tobacco: Never   Substance and Sexual Activity    Alcohol use: Yes     Comment: occasionally    Drug use: No    Sexual activity: Not on file       ROS   Review of Systems   Constitutional:  Negative for fever.   HENT:  Positive for tinnitus (R ear). Negative for congestion and sore throat.    Eyes:  Positive for visual disturbance (blurry vision).   Respiratory:  Negative for cough and shortness of breath.    Cardiovascular:  Negative for chest pain.   Gastrointestinal:  Negative for abdominal pain and nausea.   Genitourinary:  Positive for vaginal bleeding. Negative for dysuria.   Musculoskeletal:  Negative for back pain.   Skin:  Negative for rash.   Neurological:  Positive for dizziness, light-headedness and headaches. Negative for weakness and numbness.        (-) paresthesias   Hematological:  Does not bruise/bleed easily.   All other systems reviewed and are negative.      Physical Exam      Initial Vitals [09/03/23 1104]   BP Pulse Resp Temp SpO2   136/78 86 18 98.7 °F (37.1 °C) 100 %      MAP       --          Physical Exam  Nursing Notes and Vital Signs Reviewed.  Constitutional: Patient is in no acute distress. Well-developed and well-nourished.  Head: Atraumatic. Normocephalic.  Eyes: PERRL. EOM intact. Conjunctivae are not pale. No scleral icterus.  ENT: Mucous membranes are moist. Oropharynx is clear and symmetric.    Neck: Supple. Full ROM. No lymphadenopathy.  Cardiovascular: Regular rate. Regular rhythm. No murmurs, rubs, or gallops. Distal pulses are 2+ and symmetric.  Pulmonary/Chest: No respiratory distress. Clear to auscultation bilaterally. No wheezing or rales.  Abdominal: Soft and non-distended.  There is no tenderness.  No rebound, guarding,  "or rigidity.   Musculoskeletal: Moves all extremities. No obvious deformities. No edema.  Skin: Warm and dry.  Neurological:  Alert, awake, and appropriate.  Normal speech.  No acute focal neurological deficits are appreciated.  Psychiatric: Normal affect. Good eye contact. Appropriate in content.    ED Course    Procedures  ED Vital Signs:  Vitals:    09/03/23 1104 09/03/23 1134 09/03/23 1204 09/03/23 1234   BP: 136/78 130/76 126/69 106/64   Pulse: 86 80 73 72   Resp: 18 19 15 15   Temp: 98.7 °F (37.1 °C)      TempSrc: Oral      SpO2: 100% 100% 100% 100%   Weight: 80.7 kg (177 lb 14.6 oz)      Height: 5' 5" (1.651 m)       09/03/23 1304 09/03/23 1455   BP: 120/64 110/66   Pulse: 71 72   Resp: 19 16   Temp:     TempSrc:     SpO2: 100% 100%   Weight:     Height:         Abnormal Lab Results:  Labs Reviewed   CBC W/ AUTO DIFFERENTIAL - Abnormal; Notable for the following components:       Result Value    RBC 3.83 (*)     Hemoglobin 10.8 (*)     Hematocrit 34.3 (*)     MCHC 31.5 (*)     Gran # (ANC) 1.7 (*)     Lymph % 49.1 (*)     All other components within normal limits   COMPREHENSIVE METABOLIC PANEL - Abnormal; Notable for the following components:    Calcium 8.6 (*)     Alkaline Phosphatase 50 (*)     All other components within normal limits   URINALYSIS, REFLEX TO URINE CULTURE - Abnormal; Notable for the following components:    Ketones, UA Trace (*)     All other components within normal limits    Narrative:     Specimen Source->Urine   TROPONIN I   B-TYPE NATRIURETIC PEPTIDE   PREGNANCY TEST, URINE RAPID    Narrative:     Specimen Source->Urine        All Lab Results:  Results for orders placed or performed during the hospital encounter of 09/03/23   CBC auto differential   Result Value Ref Range    WBC 4.01 3.90 - 12.70 K/uL    RBC 3.83 (L) 4.00 - 5.40 M/uL    Hemoglobin 10.8 (L) 12.0 - 16.0 g/dL    Hematocrit 34.3 (L) 37.0 - 48.5 %    MCV 90 82 - 98 fL    MCH 28.2 27.0 - 31.0 pg    MCHC 31.5 (L) 32.0 - " 36.0 g/dL    RDW 13.2 11.5 - 14.5 %    Platelets 318 150 - 450 K/uL    MPV 11.9 9.2 - 12.9 fL    Immature Granulocytes 0.2 0.0 - 0.5 %    Gran # (ANC) 1.7 (L) 1.8 - 7.7 K/uL    Immature Grans (Abs) 0.01 0.00 - 0.04 K/uL    Lymph # 2.0 1.0 - 4.8 K/uL    Mono # 0.3 0.3 - 1.0 K/uL    Eos # 0.1 0.0 - 0.5 K/uL    Baso # 0.02 0.00 - 0.20 K/uL    nRBC 0 0 /100 WBC    Gran % 41.8 38.0 - 73.0 %    Lymph % 49.1 (H) 18.0 - 48.0 %    Mono % 7.2 4.0 - 15.0 %    Eosinophil % 1.2 0.0 - 8.0 %    Basophil % 0.5 0.0 - 1.9 %    Differential Method Automated    Comprehensive metabolic panel   Result Value Ref Range    Sodium 142 136 - 145 mmol/L    Potassium 4.4 3.5 - 5.1 mmol/L    Chloride 109 95 - 110 mmol/L    CO2 25 23 - 29 mmol/L    Glucose 87 70 - 110 mg/dL    BUN 13 6 - 20 mg/dL    Creatinine 1.0 0.5 - 1.4 mg/dL    Calcium 8.6 (L) 8.7 - 10.5 mg/dL    Total Protein 6.2 6.0 - 8.4 g/dL    Albumin 3.6 3.5 - 5.2 g/dL    Total Bilirubin 0.7 0.1 - 1.0 mg/dL    Alkaline Phosphatase 50 (L) 55 - 135 U/L    AST 16 10 - 40 U/L    ALT 10 10 - 44 U/L    eGFR >60 >60 mL/min/1.73 m^2    Anion Gap 8 8 - 16 mmol/L   Troponin I #1   Result Value Ref Range    Troponin I 0.011 0.000 - 0.026 ng/mL   BNP   Result Value Ref Range    BNP <10 0 - 99 pg/mL   Urinalysis, Reflex to Urine Culture Urine, Clean Catch    Specimen: Urine   Result Value Ref Range    Specimen UA Urine, Clean Catch     Color, UA Yellow Yellow, Straw, Charleen    Appearance, UA Clear Clear    pH, UA 6.0 5.0 - 8.0    Specific Gravity, UA 1.025 1.005 - 1.030    Protein, UA Negative Negative    Glucose, UA Negative Negative    Ketones, UA Trace (A) Negative    Bilirubin (UA) Negative Negative    Occult Blood UA Negative Negative    Nitrite, UA Negative Negative    Urobilinogen, UA Negative <2.0 EU/dL    Leukocytes, UA Negative Negative   Pregnancy, urine rapid   Result Value Ref Range    Preg Test, Ur Negative          Imaging Results:  Imaging Results              X-Ray Chest AP Portable  (Final result)  Result time 09/03/23 11:56:27      Final result by Onesimo Zhu III, MD (09/03/23 11:56:27)                   Impression:      No acute findings.      Electronically signed by: Onesimo Zhu MD  Date:    09/03/2023  Time:    11:56               Narrative:    EXAMINATION:  XR CHEST AP PORTABLE    CLINICAL HISTORY:  dizziness;    FINDINGS:  The cardiomediastinal silhouette is within normal limits for AP technique. The lungs appear clear of active disease. No acute appearing infiltrate, pleural effusion or pneumothorax identified.                                     The EKG was ordered, reviewed, and independently interpreted by the ED provider.  Interpretation time: 11:15  Rate: 73 BPM  Rhythm: normal sinus rhythm  Interpretation: No acute ST changes. No STEMI.           The Emergency Provider reviewed the vital signs and test results, which are outlined above.    ED Discussion     2:48 PM: Reassessed pt at this time. Pt reports that she feels better. Discussed with pt all pertinent ED information and results. Discussed pt dx and plan of tx. Gave pt all f/u and return to the ED instructions. All questions and concerns were addressed at this time. Pt expresses understanding of information and instructions, and is comfortable with plan to discharge. Pt is stable for discharge.    I discussed with patient and/or family/caretaker that evaluation in the ED does not suggest any emergent or life threatening medical conditions requiring immediate intervention beyond what was provided in the ED, and I believe patient is safe for discharge.  Regardless, an unremarkable evaluation in the ED does not preclude the development or presence of a serious of life threatening condition. As such, patient was instructed to return immediately for any worsening or change in current symptoms.    Regarding ANEMIA, I discussed with patient the signs and symptoms related to anemia such as paleness, fatigue, tachycardia,  cold hands and feet, brittle nails, headaches, and SOB. Encouraged patient to eat foods high in iron (liver and other meats; seafood; dried fruits, nuts; beans; green leafy vegetables; whole grains; and iron-fortified breads and cereals), take iron supplements with food, increase fiber intake, and take supplement early in day. Advised patient to notify primary care provider of any bothersome side effects (heartburn, constipation, abdominal pain).     Regarding DEHYDRATION, advised patient to call 911 if they should experience confusion, dizziness, lethargy, and/or lightheadedness.  The patient was instructed to contact their primary care provider immediately or return to the ED for any of the following symptoms: blood in the stool or vomit; diarrhea or vomiting for an extended period of time (vomiting > 24 hours or diarrhea for > 3 days); dry mouth or dry eyes; poor skin turgor; listlessness and inactiveness; tachycardia; little or no urine output for 8 hours; inability to keep fluids down; and sunken eyes.  The patient was encouraged to drink plenty of water daily and to increase water intake when weather is hot or during exercise.         ED Medication(s):  Medications   sodium chloride 0.9% bolus 1,000 mL 1,000 mL (0 mLs Intravenous Stopped 9/3/23 2638)        Follow-up Information       Aliza Carbajal MD In 1 week.    Specialty: Family Medicine  Contact information:  [address] [place] [address] [phone]               [facility]. Schedule an appointment as soon as possible for a visit in 1 week.    Specialty: Obstetrics and Gynecology  Why: or with your physician  Contact information:  [address] [facility] [address] [phone]  Additional information:  Please park on the [address] side and use the Clinic entrance. Check in on the 2nd floor, to the left.             [facility] - Emergency Dept..    Specialty: Emergency Medicine  Why: As needed, If  symptoms worsen  Contact information:  [address] [facility] [address] [phone]                           Discharge Medication List as of 9/3/2023  2:53 PM            Medical Decision Making    Medical Decision Making  Amount and/or Complexity of Data Reviewed  Labs: ordered. Decision-making details documented in ED Course.  Radiology: ordered. Decision-making details documented in ED Course.  ECG/medicine tests: ordered. Decision-making details documented in ED Course.    Risk  OTC drugs.  Prescription drug management.                Scribe Attestation:   Scribe #1: I performed the above scribed service and the documentation accurately describes the services I performed. I attest to the accuracy of the note.    Attending:   Physician Attestation Statement for Scribe #1: I, Donny Cummings Jr., MD, personally performed the services described in this documentation, as scribed by Yoana Wolfe, in my presence, and it is both accurate and complete.          Clinical Impression       ICD-10-CM ICD-9-CM   1. Anemia, unspecified type  D64.9 285.9   2. Dizziness  R42 780.4   3. Dehydration  E86.0 276.51   4. Lightheaded  R42 780.4       Disposition:   Disposition: Discharged  Condition: Stable         Donny Cummings Jr., MD  09/13/23 6179

## 2023-09-03 NOTE — DISCHARGE INSTRUCTIONS
Regarding ANEMIA, I discussed with patient the signs and symptoms related to anemia such as paleness, fatigue, tachycardia, cold hands and feet, brittle nails, headaches, and SOB. Encouraged patient to eat foods high in iron (liver and other meats; seafood; dried fruits, nuts; beans; green leafy vegetables; whole grains; and iron-fortified breads and cereals), take iron supplements with food, increase fiber intake, and take supplement early in day. Advised patient to notify primary care provider of any bothersome side effects (heartburn, constipation, abdominal pain).     Regarding DEHYDRATION, advised patient to call 911 if they should experience confusion, dizziness, lethargy, and/or lightheadedness.  The patient was instructed to contact their primary care provider immediately or return to the ED for any of the following symptoms: blood in the stool or vomit; diarrhea or vomiting for an extended period of time (vomiting > 24 hours or diarrhea for > 3 days); dry mouth or dry eyes; poor skin turgor; listlessness and inactiveness; tachycardia; little or no urine output for 8 hours; inability to keep fluids down; and sunken eyes.  The patient was encouraged to drink plenty of water daily and to increase water intake when weather is hot or during exercise.

## 2023-12-06 ENCOUNTER — HOSPITAL ENCOUNTER (EMERGENCY)
Facility: HOSPITAL | Age: 43
Discharge: HOME | End: 2023-12-06
Attending: EMERGENCY MEDICINE
Payer: COMMERCIAL

## 2023-12-06 VITALS
RESPIRATION RATE: 18 BRPM | WEIGHT: 180.75 LBS | HEART RATE: 70 BPM | DIASTOLIC BLOOD PRESSURE: 69 MMHG | SYSTOLIC BLOOD PRESSURE: 133 MMHG | OXYGEN SATURATION: 100 % | BODY MASS INDEX: 30.08 KG/M2 | TEMPERATURE: 99 F

## 2023-12-06 DIAGNOSIS — M54.50 LUMBAR BACK PAIN: Primary | ICD-10-CM

## 2023-12-06 LAB
BILIRUB UR QL STRIP: NEGATIVE
CLARITY UR: CLEAR
COLOR UR: YELLOW
GLUCOSE UR QL STRIP: NEGATIVE
HGB UR QL STRIP: NEGATIVE
KETONES UR QL STRIP: NEGATIVE
LEUKOCYTE ESTERASE UR QL STRIP: NEGATIVE
NITRITE UR QL STRIP: NEGATIVE
PH UR STRIP: 6 [PH] (ref 5–8)
PROT UR QL STRIP: ABNORMAL
SP GR UR STRIP: >1.03 (ref 1–1.03)
URN SPEC COLLECT METH UR: ABNORMAL
UROBILINOGEN UR STRIP-ACNC: NEGATIVE EU/DL

## 2023-12-06 PROCEDURE — 96372 THER/PROPH/DIAG INJ SC/IM: CPT | Performed by: REGISTERED NURSE

## 2023-12-06 PROCEDURE — 99284 EMERGENCY DEPT VISIT MOD MDM: CPT

## 2023-12-06 PROCEDURE — 81003 URINALYSIS AUTO W/O SCOPE: CPT | Performed by: REGISTERED NURSE

## 2023-12-06 PROCEDURE — 63600175 PHARM REV CODE 636 W HCPCS: Performed by: REGISTERED NURSE

## 2023-12-06 RX ORDER — METHOCARBAMOL 500 MG/1
500 TABLET, FILM COATED ORAL 3 TIMES DAILY
Qty: 15 TABLET | Refills: 0 | Status: SHIPPED | OUTPATIENT
Start: 2023-12-06 | End: 2023-12-11

## 2023-12-06 RX ORDER — KETOROLAC TROMETHAMINE 30 MG/ML
60 INJECTION, SOLUTION INTRAMUSCULAR; INTRAVENOUS
Status: COMPLETED | OUTPATIENT
Start: 2023-12-06 | End: 2023-12-06

## 2023-12-06 RX ORDER — DICLOFENAC SODIUM 50 MG/1
50 TABLET, DELAYED RELEASE ORAL 2 TIMES DAILY
Qty: 20 TABLET | Refills: 0 | Status: SHIPPED | OUTPATIENT
Start: 2023-12-06

## 2023-12-06 RX ORDER — FLUCONAZOLE 150 MG/1
150 TABLET ORAL WEEKLY
Qty: 2 TABLET | Refills: 0 | Status: SHIPPED | OUTPATIENT
Start: 2023-12-06 | End: 2023-12-14

## 2023-12-06 RX ADMIN — KETOROLAC TROMETHAMINE 60 MG: 30 INJECTION, SOLUTION INTRAMUSCULAR at 12:12

## 2023-12-06 NOTE — ED PROVIDER NOTES
"Encounter Date: 12/6/2023       History     Chief Complaint   Patient presents with    Low-back Pain     Reports lower back pain with hx of sciatica. Also reports "sharp pains in bladder".  Pt had cosmetic sx 2 months ago and back pains have worsened.      43 year old female presents to ED with complaints of low back pain and sharp pain to the bladder.  Patient reports symptoms have worsened since having a BBL done 2 months ago.  History of sciatica.  Patient denies any saddle anesthesia, fever, chills, bowel bladder incontinence or any other symptoms.    The history is provided by the patient.     Review of patient's allergies indicates:   Allergen Reactions    Fioricet [butalbital-acetaminophen-caff] Other (See Comments)     Past Medical History:   Diagnosis Date    Allergy     Herpes genitalia     Migraine headache     PCOS (polycystic ovarian syndrome)     Shingles      Past Surgical History:   Procedure Laterality Date    ADENOIDECTOMY      FOOT SURGERY      TONSILLECTOMY       Family History   Problem Relation Age of Onset    Colon cancer Neg Hx     Ovarian cancer Neg Hx     Breast cancer Other      Social History     Tobacco Use    Smoking status: Never    Smokeless tobacco: Never   Substance Use Topics    Alcohol use: Yes     Comment: occasionally    Drug use: No     Review of Systems   Constitutional:  Negative for fever.   HENT:  Negative for sore throat.    Respiratory:  Negative for shortness of breath.    Cardiovascular:  Negative for chest pain.   Gastrointestinal:  Negative for nausea.   Genitourinary:  Negative for dysuria.   Musculoskeletal:  Positive for back pain.   Skin:  Negative for rash.   Neurological:  Negative for weakness.   Hematological:  Does not bruise/bleed easily.   All other systems reviewed and are negative.      Physical Exam     Initial Vitals [12/06/23 1233]   BP Pulse Resp Temp SpO2   133/69 70 18 98.7 °F (37.1 °C) 100 %      MAP       --         Physical " Exam    Constitutional: She appears well-developed and well-nourished. She is not diaphoretic. No distress.   HENT:   Head: Normocephalic and atraumatic.   Eyes: Conjunctivae and EOM are normal. Pupils are equal, round, and reactive to light.   Neck: Neck supple.   Normal range of motion.  Cardiovascular:  Normal rate, regular rhythm and normal heart sounds.           No murmur heard.  Pulmonary/Chest: Breath sounds normal. No respiratory distress. She has no wheezes. She has no rales.   Abdominal: Abdomen is soft. Bowel sounds are normal. There is no abdominal tenderness. There is no rebound and no guarding.   Musculoskeletal:         General: No edema. Normal range of motion.      Cervical back: Normal range of motion and neck supple.      Lumbar back: Tenderness present. Negative right straight leg raise test and negative left straight leg raise test.        Back:      Neurological: She is alert and oriented to person, place, and time. No cranial nerve deficit. GCS score is 15. GCS eye subscore is 4. GCS verbal subscore is 5. GCS motor subscore is 6.   Skin: Skin is warm and dry. Capillary refill takes less than 2 seconds.   Psychiatric: She has a normal mood and affect. Thought content normal.         ED Course   Procedures  Labs Reviewed   URINALYSIS, REFLEX TO URINE CULTURE - Abnormal; Notable for the following components:       Result Value    Specific Gravity, UA >1.030 (*)     Protein, UA Trace (*)     All other components within normal limits    Narrative:     Specimen Source->Urine          Imaging Results              X-Ray Lumbar Spine Ap And Lateral (Final result)  Result time 12/06/23 13:45:39      Final result by Brendan Mathew III, MD (12/06/23 13:45:39)                   Impression:      No radiographic abnormality      Electronically signed by: Salvador Mathew  Date:    12/06/2023  Time:    13:45               Narrative:    EXAMINATION:  XR LUMBAR SPINE AP AND LATERAL    CLINICAL  HISTORY:  low back pain;    TECHNIQUE:  AP, lateral and spot images were performed of the lumbar spine.    COMPARISON:  None    FINDINGS:  There are 5 non-rib-bearing lumbar vertebra.  No fracture, compression deformity, disc space narrowing or spondylolisthesis.                                       Medications   ketorolac injection 60 mg (60 mg Intramuscular Given 12/6/23 1246)     Medical Decision Making  Amount and/or Complexity of Data Reviewed  Radiology: ordered.    Risk  Prescription drug management.                                      Clinical Impression:  Final diagnoses:  [M54.50] Lumbar back pain (Primary)          ED Disposition Condition    Discharge Stable          ED Prescriptions       Medication Sig Dispense Start Date End Date Auth. Provider    diclofenac (VOLTAREN) 50 MG EC tablet Take 1 tablet (50 mg total) by mouth 2 (two) times daily. 20 tablet 12/6/2023 -- Jose R Patiño Jr., FNP    methocarbamoL (ROBAXIN) 500 MG Tab Take 1 tablet (500 mg total) by mouth 3 (three) times daily. for 5 days 15 tablet 12/6/2023 12/11/2023 Jose R Patiño Jr., FNP    fluconazole (DIFLUCAN) 150 MG Tab Take 1 tablet (150 mg total) by mouth once a week. for 2 doses 2 tablet 12/6/2023 12/14/2023 Jose R Patiño Jr., FNP          Follow-up Information       Follow up With Specialties Details Why Contact Info    [facility] Emergency Dept. Emergency Medicine  If symptoms worsen [id number] [facility] [id number] [phone]             Jose R Patiño Jr., FNP  12/06/23 9615

## 2024-10-22 ENCOUNTER — LAB VISIT (OUTPATIENT)
Dept: LAB | Facility: HOSPITAL | Age: 44
End: 2024-10-22
Attending: DERMATOLOGY
Payer: COMMERCIAL

## 2024-10-22 DIAGNOSIS — B35.9 TINEA: Primary | ICD-10-CM

## 2024-10-22 PROCEDURE — 88305 TISSUE EXAM BY PATHOLOGIST: CPT | Performed by: DERMATOLOGY

## 2024-10-22 PROCEDURE — 88312 SPECIAL STAINS GROUP 1: CPT | Performed by: DERMATOLOGY

## 2024-10-30 LAB
FINAL PATHOLOGIC DIAGNOSIS: NORMAL
GROSS: NORMAL
Lab: NORMAL
MICROSCOPIC EXAM: NORMAL

## 2024-12-02 ENCOUNTER — OFFICE VISIT (OUTPATIENT)
Dept: PODIATRY | Facility: CLINIC | Age: 44
End: 2024-12-02
Payer: COMMERCIAL

## 2024-12-02 DIAGNOSIS — L60.3 ONYCHODYSTROPHY: ICD-10-CM

## 2024-12-02 DIAGNOSIS — L60.8 DISCOLORATION OF NAIL: Primary | ICD-10-CM

## 2024-12-02 PROCEDURE — 99203 OFFICE O/P NEW LOW 30 MIN: CPT | Mod: S$GLB,,, | Performed by: PODIATRIST

## 2024-12-02 PROCEDURE — 1160F RVW MEDS BY RX/DR IN RCRD: CPT | Mod: CPTII,S$GLB,, | Performed by: PODIATRIST

## 2024-12-02 PROCEDURE — 3044F HG A1C LEVEL LT 7.0%: CPT | Mod: CPTII,S$GLB,, | Performed by: PODIATRIST

## 2024-12-02 PROCEDURE — 1159F MED LIST DOCD IN RCRD: CPT | Mod: CPTII,S$GLB,, | Performed by: PODIATRIST

## 2024-12-02 PROCEDURE — 87220 TISSUE EXAM FOR FUNGI: CPT | Performed by: PODIATRIST

## 2024-12-02 PROCEDURE — 99999 PR PBB SHADOW E&M-EST. PATIENT-LVL III: CPT | Mod: PBBFAC,,, | Performed by: PODIATRIST

## 2024-12-02 NOTE — PROGRESS NOTES
Subjective:       Patient ID: Thomas Naranjo is a 44 y.o. female.    Chief Complaint: Nail Problem (Right great hallux: pt denies pain, pt is nondiabetic, states of itching of feet as well.)      HPI: Thomas Naranjo presents to the office with complaints of abnormal appearance to the right great  toe, due to discoloration of the nail plate.   Reports no signs of cellulitis, ingrowing, or drainage.  Also dryness itchy sensation to.  Does not routinely utilize emollient compounds. Rates pain as a 0/10.Patient's Primary Care Provider is No, Primary Doctor.     Review of patient's allergies indicates:   Allergen Reactions    Fioricet [butalbital-acetaminophen-caff] Other (See Comments)       Past Medical History:   Diagnosis Date    Allergy     Herpes genitalia     Migraine headache     PCOS (polycystic ovarian syndrome)     Shingles        Family History   Problem Relation Name Age of Onset    Colon cancer Neg Hx      Ovarian cancer Neg Hx      Breast cancer Other         Social History     Socioeconomic History    Marital status: Single   Tobacco Use    Smoking status: Never    Smokeless tobacco: Never   Substance and Sexual Activity    Alcohol use: Yes     Comment: occasionally    Drug use: No       Past Surgical History:   Procedure Laterality Date    ADENOIDECTOMY      FOOT SURGERY      TONSILLECTOMY         Review of Systems      Objective:   There were no vitals taken for this visit.    Physical Exam  LOWER EXTREMITY PHYSICAL EXAMINATION    NEUROLOGY: Sensation to light touch is intact. Proprioception is intact.  Vibratory sensation intact    VASCULAR:  The right dorsalis pedis pulse 2/4 and the right posterior tibial pulse 2/4.  The left dorsalis pedis pulse 2/4 and posterior tibial pulse on the left is 2/4.  Capillary refill is intact.  Pedal hair growth intact    DERMATOLOGY:  Discoloration the distal central right nail plate.  No signs of ingrown.  Nail does have topical Sclack polish over the nail plate.   No edema present..  There is no cellulitis noted.  No fluctuance.       Assessment:     1. Discoloration of nail    2. Onychodystrophy        Plan:     Discoloration of nail  -     KOH PREP (SKIN, HAIR, NAILS)    Onychodystrophy  -     KOH PREP (SKIN, HAIR, NAILS)        Thorough discussion is had with the patient today, concerning the diagnosis, its etiology, and the treatment algorithm at present.    Discussed pathology in etiology associated with onychodystrophy as it relates to direct/indirect trauma, fungus/onychomycosis, vitamin insufficiencies, smoking, rheumatological pathologies, chemotherapy agents, peripheral vascular disease, diabetes mellitus,  history of nail loss or bleeding under the toenail, chemicals in nail polishes, etc...    We discuss assessment for possible fungus.  Will obtain nail sample to determine if cultures does show evidence of fungal elements.    Discussed treatment options for possible onychomycosis.  Patient relates interested in oral medication rather than topical management.  Did discuss potential for complications associated with liver function.  She states understanding.  Previous liver function studies 2024 within normal limits.      Future Appointments   Date Time Provider Department Center   12/9/2024 10:45 AM Dea Montejo NP [facility]

## 2024-12-03 ENCOUNTER — TELEPHONE (OUTPATIENT)
Dept: PODIATRY | Facility: CLINIC | Age: 44
End: 2024-12-03
Payer: COMMERCIAL

## 2024-12-03 LAB — KOH PREP SPEC: NORMAL

## 2024-12-03 NOTE — TELEPHONE ENCOUNTER
Patient informed nail sample was negative. Patient acknowledges understanding. Call ended pleasantly   ----- Message from Perla Ann DPM sent at 12/3/2024  8:10 AM CST -----  Please call patient with results. Thank you

## 2024-12-09 ENCOUNTER — OFFICE VISIT (OUTPATIENT)
Dept: OBSTETRICS AND GYNECOLOGY | Facility: CLINIC | Age: 44
End: 2024-12-09
Payer: COMMERCIAL

## 2024-12-09 ENCOUNTER — LAB VISIT (OUTPATIENT)
Dept: LAB | Facility: HOSPITAL | Age: 44
End: 2024-12-09
Payer: COMMERCIAL

## 2024-12-09 VITALS
HEIGHT: 64 IN | DIASTOLIC BLOOD PRESSURE: 60 MMHG | BODY MASS INDEX: 31.32 KG/M2 | WEIGHT: 183.44 LBS | SYSTOLIC BLOOD PRESSURE: 124 MMHG

## 2024-12-09 DIAGNOSIS — Z12.31 ENCOUNTER FOR SCREENING MAMMOGRAM FOR MALIGNANT NEOPLASM OF BREAST: ICD-10-CM

## 2024-12-09 DIAGNOSIS — Z01.419 ENCOUNTER FOR ANNUAL ROUTINE GYNECOLOGICAL EXAMINATION: Primary | ICD-10-CM

## 2024-12-09 DIAGNOSIS — Z11.3 SCREEN FOR STD (SEXUALLY TRANSMITTED DISEASE): ICD-10-CM

## 2024-12-09 DIAGNOSIS — Z12.4 CERVICAL CANCER SCREENING: ICD-10-CM

## 2024-12-09 LAB
HAV IGM SERPL QL IA: NORMAL
HBV CORE IGM SERPL QL IA: NORMAL
HBV SURFACE AG SERPL QL IA: NORMAL
HCV AB SERPL QL IA: NORMAL
HIV 1+2 AB+HIV1 P24 AG SERPL QL IA: NORMAL
TREPONEMA PALLIDUM IGG+IGM AB [PRESENCE] IN SERUM OR PLASMA BY IMMUNOASSAY: NONREACTIVE

## 2024-12-09 PROCEDURE — 3074F SYST BP LT 130 MM HG: CPT | Mod: CPTII,S$GLB,,

## 2024-12-09 PROCEDURE — 36415 COLL VENOUS BLD VENIPUNCTURE: CPT

## 2024-12-09 PROCEDURE — 3044F HG A1C LEVEL LT 7.0%: CPT | Mod: CPTII,S$GLB,,

## 2024-12-09 PROCEDURE — 3078F DIAST BP <80 MM HG: CPT | Mod: CPTII,S$GLB,,

## 2024-12-09 PROCEDURE — 1159F MED LIST DOCD IN RCRD: CPT | Mod: CPTII,S$GLB,,

## 2024-12-09 PROCEDURE — 87389 HIV-1 AG W/HIV-1&-2 AB AG IA: CPT

## 2024-12-09 PROCEDURE — 87491 CHLMYD TRACH DNA AMP PROBE: CPT

## 2024-12-09 PROCEDURE — 88175 CYTOPATH C/V AUTO FLUID REDO: CPT

## 2024-12-09 PROCEDURE — 86593 SYPHILIS TEST NON-TREP QUANT: CPT

## 2024-12-09 PROCEDURE — 3008F BODY MASS INDEX DOCD: CPT | Mod: CPTII,S$GLB,,

## 2024-12-09 PROCEDURE — 99999 PR PBB SHADOW E&M-EST. PATIENT-LVL IV: CPT | Mod: PBBFAC,,,

## 2024-12-09 PROCEDURE — 99386 PREV VISIT NEW AGE 40-64: CPT | Mod: S$GLB,,,

## 2024-12-09 PROCEDURE — 80074 ACUTE HEPATITIS PANEL: CPT

## 2024-12-09 RX ORDER — SPIRONOLACTONE 50 MG/1
TABLET, FILM COATED ORAL
COMMUNITY
Start: 2024-10-14

## 2024-12-09 RX ORDER — IBUPROFEN 800 MG/1
800 TABLET ORAL
COMMUNITY
Start: 2024-06-28

## 2024-12-09 RX ORDER — CLOBETASOL PROPIONATE 0.5 MG/ML
SOLUTION TOPICAL
COMMUNITY
Start: 2024-11-06

## 2024-12-09 RX ORDER — SUCRALFATE 1 G/10ML
1 SUSPENSION ORAL 4 TIMES DAILY
COMMUNITY

## 2024-12-09 RX ORDER — TRAMADOL HYDROCHLORIDE 50 MG/1
50 TABLET ORAL
COMMUNITY
Start: 2024-06-28

## 2024-12-09 RX ORDER — TRIAMCINOLONE ACETONIDE 1 MG/G
OINTMENT TOPICAL
COMMUNITY
Start: 2024-11-05

## 2024-12-09 RX ORDER — DOXYCYCLINE 100 MG/1
1 TABLET ORAL
COMMUNITY
Start: 2024-11-07

## 2024-12-09 NOTE — PROGRESS NOTES
Subjective:       Patient ID: Thomas Naranjo is a 44 y.o. female.    Chief Complaint:  Well Woman      History of Present Illness  HPI  Annual Exam-Premenopausal  Patient presents for annual exam. The patient has no complaints today. The patient is sexually active, Requesting STD testing. GYN screening history: last pap: approximate date 23 and was normal and last mammogram: approximate date 23 and was normal. The patient wears seatbelts: yes. The patient participates in regular exercise: no. Has the patient ever been transfused or tattooed?: yes. The patient reports that there is not domestic violence in her life. Menses are regular with periods lasting 7 days. Heavy thr first 4 days, wearing pads and depends.     Patient reports history of cervical dysplasia years ago, all of her most recent paps WNL, prefers annual pap smears   GYN & OB History  Patient's last menstrual period was 2024.   Date of Last Pap: 2024    OB History    Para Term  AB Living   0 0 0 0 0 0   SAB IAB Ectopic Multiple Live Births   0 0 0 0         Review of Systems  Review of Systems   Constitutional: Negative.    HENT: Negative.     Eyes: Negative.    Respiratory: Negative.     Cardiovascular: Negative.    Gastrointestinal: Negative.    Genitourinary: Negative.    Musculoskeletal: Negative.    Integumentary:  Negative.   Neurological: Negative.    Hematological: Negative.    Psychiatric/Behavioral: Negative.     All other systems reviewed and are negative.  Breast: negative.            Objective:      Physical Exam:   Constitutional: She is oriented to person, place, and time. She appears well-developed and well-nourished.    HENT:   Head: Normocephalic and atraumatic.   Nose: Nose normal.    Eyes: Pupils are equal, round, and reactive to light. Conjunctivae and EOM are normal.     Cardiovascular:  Normal rate and regular rhythm.             Pulmonary/Chest: Effort normal. She has no decreased breath  sounds. She has no rhonchi. Right breast exhibits no inverted nipple, no mass, no nipple discharge, no tenderness and no bleeding. Left breast exhibits no inverted nipple, no mass, no nipple discharge, no tenderness and no bleeding. Breasts are symmetrical.        Abdominal: Soft. There is no abdominal tenderness.     Genitourinary:    Inguinal canal, vagina, uterus, right adnexa and left adnexa normal.      Pelvic exam was performed with patient supine.   The external female genitalia was normal.   No external genitalia lesions identified,Genitalia hair distrobution normal .     Labial bartholins normal.Cervix is normal. Right adnexum displays no mass and no tenderness. Left adnexum displays no mass and no tenderness. No vaginal discharge, tenderness or bleeding in the vagina. Vagina was moist.Cervix exhibits no motion tenderness, no discharge and no tenderness.    pap smear completedUerus contour normal  Uterus is not tender. Normal urethral meatus.          Musculoskeletal: Normal range of motion and moves all extremeties.       Neurological: She is alert and oriented to person, place, and time.    Skin: Skin is warm and dry.    Psychiatric: She has a normal mood and affect. Her speech is normal and behavior is normal. Mood, judgment and thought content normal.             Assessment:        1. Encounter for annual routine gynecological examination    2. Cervical cancer screening    3. Screen for STD (sexually transmitted disease)    4. Encounter for screening mammogram for malignant neoplasm of breast               Plan:   Continue annual well woman exam.  Pap collected. Patient was counseled today on ASCCP screening guidelines (pap smear every 3 years in low risk patients) and recommendations for annual pelvic exams and clinical breast exams, yearly mammograms; and to see her PCP for other healthcare maintenance.      Diagnosis and orders this visit:  Encounter for annual routine gynecological  examination    Cervical cancer screening  -     Liquid-Based Pap Smear, Screening  -     HPV High Risk Genotypes, PCR    Screen for STD (sexually transmitted disease)  -     C. trachomatis/N. gonorrhoeae by AMP DNA  -     HIV 1/2 Ag/Ab (4th Gen); Future; Expected date: 12/09/2024  -     Hepatitis Panel, Acute; Future; Expected date: 12/09/2024  -     Treponema Pallidium Antibodies IgG, IgM; Future; Expected date: 12/09/2024    Encounter for screening mammogram for malignant neoplasm of breast  -     Mammo Digital Screening Bilat; Future; Expected date: 12/16/2024         Dea Montejo, NP

## 2024-12-12 LAB
FINAL PATHOLOGIC DIAGNOSIS: NORMAL
Lab: NORMAL

## 2024-12-26 ENCOUNTER — HOSPITAL ENCOUNTER (OUTPATIENT)
Dept: RADIOLOGY | Facility: HOSPITAL | Age: 44
Discharge: HOME | End: 2024-12-26
Payer: COMMERCIAL

## 2024-12-26 DIAGNOSIS — Z12.31 ENCOUNTER FOR SCREENING MAMMOGRAM FOR MALIGNANT NEOPLASM OF BREAST: ICD-10-CM

## 2024-12-26 PROCEDURE — 77063 BREAST TOMOSYNTHESIS BI: CPT | Mod: TC

## 2025-02-10 ENCOUNTER — TELEPHONE (OUTPATIENT)
Dept: PAIN MEDICINE | Facility: CLINIC | Age: 45
End: 2025-02-10
Payer: COMMERCIAL

## 2025-02-12 NOTE — H&P (VIEW-ONLY)
New Patient Interventional Pain Note (Initial Visit)    Referring Physician: Aravind, Irasema    PCP: No, Primary Doctor    Chief Complaint:   Chief Complaint   Patient presents with    Hip Pain        SUBJECTIVE:    Thomas Naranjo is a 44 y.o. female with past medical history significant for migraine headache, polycystic ovarian syndrome who presents to the clinic for the evaluation of lower back, hip and SI joint pain.  Patient reports pain has been present for several years.  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  Pain is constant and today is rated a 2/10.  Pain at its worse is a 10/10.  Patient reports she has been diagnosed with arthritis and sciatic nerve in the past.  Patient has been under the care of an orthopedic surgeon, chiropractor ([facility] in [place]), [facility] locally as well as been evaluated at [facility] in the past.  She reports pain in a bandlike distribution in the lower back which can radiate to the hips, SI joint territory as well as inguinal region.  Pain can be exacerbated when moving from sitting to standing with bending and lifting motions.  Pain is improved with diclofenac tablets, lying supine in alternating hot and cold.  Patient has received steroid injections which sound intramuscular nature every 3 months which gave her short-term relief.  Patient also reports history of receiving Brazilian butt lift in [place] with , which may have exacerbated her symptoms.  Today she denies more distal radiculopathy into the lower extremities or feet, weakness in the lower extremities.  Patient has also trialed tramadol and ibuprofen in the past with ineffective relief.    Patient denies night fever/night sweats, urinary incontinence, bowel incontinence, significant weight loss, significant motor weakness, and loss of sensations.      Pain Disability Index Review:          2/13/2025    11:03 AM   Last 3 PDI Scores   Pain Disability Index (PDI) 40       Non-Pharmacologic Treatments:  Physical Therapy/Home Exercise: yes  Ice/Heat:yes  TENS: no  Acupuncture: no  Massage: yes  Chiropractic: yes    Other: no      Pain Medications:  - Opioids: Ultram (Tramadol HCL)  - Adjuvant Medications: Advil,Motrin ( Ibuprofen), Trokendi    Pain Procedures:   None    Past Medical History:   Diagnosis Date    Allergy     Central centrifugal cicatricial alopecia     Herpes genitalia     Migraine headache     PCOS (polycystic ovarian syndrome)     Shingles      Past Surgical History:   Procedure Laterality Date    ADENOIDECTOMY      FAT TRANSFER  09/2023    BBL with lip    FOOT SURGERY Bilateral 2004    pinky toes removed    REDUCTION OF BOTH BREASTS Bilateral 02/13/2023    TONSILLECTOMY      TOTAL REDUCTION MAMMOPLASTY       Review of patient's allergies indicates:   Allergen Reactions    Fioricet [butalbital-acetaminophen-caff] Other (See Comments)       Current Outpatient Medications   Medication Sig    acyclovir (ZOVIRAX) 800 MG Tab Take 800 mg by mouth 2 (two) times daily.    acyclovir 5% (ZOVIRAX) 5 % ointment Apply topically 6 (six) times daily.    azelastine (ASTELIN) 137 mcg (0.1 %) nasal spray USE 1 SPRAY(S) IN EACH NOSTRIL TWICE DAILY PRN    clindamycin (CLEOCIN T) 1 % external solution Apply topically.    clobetasoL (TEMOVATE) 0.05 % external solution APPLY TO SCALP IN AREAS OF SCALP ITCHING 3 TIMES PER WEEK, CAN ALTERNATE WITH TAC OINTMENT    diclofenac (VOLTAREN) 50 MG EC tablet Take 1 tablet (50 mg total) by mouth 2 (two) times daily.    doxycycline monohydrate 100 mg Tab Take 1 tablet by mouth.    famciclovir (FAMVIR) 250 MG Tab Take 250 mg by mouth 2 (two) times daily.    fluticasone propionate (FLONASE) 50 mcg/actuation nasal spray USE 1 SPRAY(S) IN EACH NOSTRIL TWICE DAILY PRN    ibuprofen (ADVIL,MOTRIN) 800 MG tablet Take 800 mg by mouth every 6 to 8 hours as needed for Pain.     "imiquimod (ALDARA) 5 % cream Apply topically 3 (three) times a week.    linaCLOtide (LINZESS) 72 mcg Cap capsule TAKE 1 CAPSULE BY MOUTH ONCE DAILY 30 MINUTES PRIOR TO FIRST MEAL AS NEEDED FOR CONSTIPATION    ondansetron (ZOFRAN-ODT) 4 MG TbDL Take 1 tablet (4 mg total) by mouth every 6 (six) hours as needed.    pantoprazole (PROTONIX) 40 MG tablet Take 40 mg by mouth 2 (two) times daily.    pediatric multivit-iron-min (FLINTSTONES COMPLETE, IRON,) Chew Take 2 tablets by mouth.    spironolactone (ALDACTONE) 50 MG tablet TAKE 1 TABLET BY MOUTH ONCE DAILY FOR 2 WEEKS THEN INCREASE TO 1 TABLET TWICE DAILY OR 2 TOGETHER AS TOLERATED    sucralfate (CARAFATE) 100 mg/mL suspension Take 1 g by mouth 4 (four) times daily.    traMADoL (ULTRAM) 50 mg tablet Take 50 mg by mouth every 6 to 8 hours as needed for Pain.    triamcinolone acetonide 0.1% (KENALOG) 0.1 % ointment Apply topically.    TROKENDI XR 50 mg Cp24 Take 1 tablet by mouth daily as needed.     valACYclovir (VALTREX) 1000 MG tablet Take 1,000 mg by mouth once daily.     No current facility-administered medications for this visit.         ROS:  GENERAL:  No weight loss, malaise or fevers.  HEENT:   No recent changes in vision or hearing  NECK:  Negative for lumps, no difficulty with swallowing.  RESPIRATORY:  Negative for cough, wheezing or shortness of breath, patient denies any recent URI.  CARDIOVASCULAR:  Negative for chest pain or palpitations.  GI:  Negative for abdominal discomfort, blood in stools or black stools or change in bowel habits.  MUSCULOSKELETAL:  See HPI.  SKIN:  Negative for lesions, rash, and itching.  PSYCH:  No mood disorder or recent psychosocial stressors.   HEMATOLOGY/LYMPHOLOGY:  Negative for prolonged bleeding, bruising easily or swollen nodes.    NEURO:   No history of syncope, paralysis, seizures or tremors.  All other reviewed and negative other than HPI.    OBJECTIVE:    /60   Pulse 86   Resp 17   Ht 5' 4" (1.626 m)   Wt " 79.6 kg (175 lb 7.8 oz)   BMI 30.12 kg/m²       Physical Exam:    GENERAL: Well appearing, in no acute distress, alert and oriented x3.  PSYCH:  Mood and affect appropriate.  SKIN: Skin color, texture, turgor normal, no rashes or lesions.  HEAD/FACE:  Normocephalic, atraumatic. Cranial nerves grossly intact.    CV: RRR with palpation of the radial artery.  PULM: No evidence of respiratory difficulty, symmetric chest rise.  GI:  Soft and non-tender.    BACK:  Exaggerated lumbar lordosis (history of BBL) Straight leg raising in the sitting and supine positions is negative to radicular pain. No pain to palpation over the facet joints of the lumbar spine or spinous processes. Normal range of motion without pain reproduction.  EXTREMITIES: Peripheral joint ROM is full and pain free without obvious instability or laxity in all four extremities. No deformities, edema, or skin discoloration. Good capillary refill.  MUSCULOSKELETAL: Able to stand on heels & toes.   Hip provocative maneuvers are + bilaterally.    SIJ testing: bilateral  - TTP over SI joint: Present  - Demetra's/ Bryson's: Positive    - Sacroiliac Distraction Test (anterior pressure): Positive  - Sacroiliac Compression Test (lateral pressure): Positive   - SacralThrust Test (posterior pressure): Positive       Facet loading test is negative bilaterally.   Bilateral upper and lower extremity strength is normal and symmetric.  No atrophy or tone abnormalities are noted.    RIGHT Lower extremity: Hip flexion 5/5, Hip Abduction 5/5, Hip Adduction 5/5, Knee extension 5/5, Knee flexion 5/5, Ankle dorsiflexion5/5, Extensor hallucis longus 5/5, Ankle plantarflexion 5/5  LEFT Lower extremity:  Hip flexion 5/5, Hip Abduction 5/5,Hip Adduction 5/5, Knee extension 5/5, Knee flexion 5/5, Ankle dorsiflexion 5/5, Extensor hallucis longus 5/5, Ankle plantarflexion 5/5  -Normal testing knee (patellar) jerk and ankle (achilles) jerk    NEURO: Bilateral upper and lower extremity  coordination and muscle stretch reflexes are physiologic and symmetric. No loss of sensation is noted.  GAIT: normal.    Imagin/06/23    X-Ray Lumbar Spine Ap And Lateral    Narrative  EXAMINATION:  XR LUMBAR SPINE AP AND LATERAL    CLINICAL HISTORY:  low back pain;    TECHNIQUE:  AP, lateral and spot images were performed of the lumbar spine.    COMPARISON:  None    FINDINGS:  There are 5 non-rib-bearing lumbar vertebra.  No fracture, compression deformity, disc space narrowing or spondylolisthesis.      22    X-Ray Cervical Spine AP And Lateral    Narrative  EXAMINATION:  XR CERVICAL SPINE AP LATERAL    CLINICAL HISTORY:  Pain cervical (723.1);    COMPARISON:  None.    FINDINGS:  Tip of the odontoid obscured by overlapping structures.  1.2 mm anterior subluxation C4-C5.  Otherwise alignment normal otherwise the alignment is normal.  All cervical vertebral bodies are normal in height.  Minimal disc space narrowing C4-5 and C5-6.  Prevertebral soft tissues are normal.        ASSESSMENT: 44 y.o. year old female with     1. Sacroiliitis  Case Request-RAD/Other Procedure Area: Bilateral Sacroiliac Joint and intra-articular hip joint injection; start prone then flipped supine      2. Primary osteoarthritis of both hips  X-Ray Hips Bilateral 2 View Inc AP Pelvis    Case Request-RAD/Other Procedure Area: Bilateral Sacroiliac Joint and intra-articular hip joint injection; start prone then flipped supine      3. Lumbar spondylosis              PLAN:   - Interventions:  Schedule for bilateral SI joint and intra-articular hip joint injection secondary to pain from sacroiliitis and hip osteoarthritis. Explained the risks and benefits of the procedure in detail with the patient today in clinic along with alternative treatment options, and the patient elected to pursue the intervention at this time.      - Anticoagulation use: No no anticoagulation     report:  Reviewed and consistent with medication use as  prescribed.    - Medications:  -Continue Ibuprofen 800 mg PRN pain. We have discussed potential deleterious side effects of NSAIDs on the cardiovascular, gastrointestinal and renal systems. We have discussed judicious use of this medication. Pt expresses understanding.     - Therapy:   We discussed continuing at home physician directed physical therapy to help manage the patient/s painful condition. The patient was counseled that muscle strengthening will improve the long term prognosis in regards to pain and may also help increase range of motion and mobility.       - Imaging: Reviewed available imaging(x-ray lumbar spine) with patient and answered any questions they had regarding study.  X-ray bilateral hips to better evaluate pain    - Records: Obtain old records from outside physicians and imaging                -[facility]    - Follow up visit: return to clinic in 4-6 weeks status post injection.  Extended visit with Beverly Del Angel PA-C      The above plan and management options were discussed at length with patient. Patient is in agreement with the above and verbalized understanding.    - I discussed the goals of interventional chronic pain management with the patient on today's visit. We discussed a multimodal and systematic approach to pain.  This includes diagnostic and therapeutic injections, adjuvant pharmacologic treatment, physical therapy, and at times psychiatry.  I emphasized the importance of regular exercise, core strengthening and stretching, diet and weight loss as a cornerstone of long-term pain management.    - This condition does not require this patient to take time off of work, and the primary goal of our Pain Management services is to improve the patient's functional capacity.  - Patient Questions: Answered all of the patient's questions regarding diagnoses, therapy, treatment and next steps    Visit today included increased complexity associated with the care of the episodic problem  of chronic pain which was addressed and continue to manage the longitudinal care of the patient due to the serious and/or complex managed problem(s) listed above.      Shira Piedra MD  Interventional Pain Management  [facility]    Disclaimer:  This note was prepared using voice recognition system and is likely to have sound alike errors that may have been overlooked even after proof reading.  Please call me with any questions

## 2025-02-12 NOTE — PROGRESS NOTES
New Patient Interventional Pain Note (Initial Visit)    Referring Physician: Aravind, Irasema    PCP: No, Primary Doctor    Chief Complaint:   Chief Complaint   Patient presents with    Hip Pain        SUBJECTIVE:    Thomas Naranjo is a 44 y.o. female with past medical history significant for migraine headache, polycystic ovarian syndrome who presents to the clinic for the evaluation of lower back, hip and SI joint pain.  Patient reports pain has been present for several years.  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  Pain is constant and today is rated a 2/10.  Pain at its worse is a 10/10.  Patient reports she has been diagnosed with arthritis and sciatic nerve in the past.  Patient has been under the care of an orthopedic surgeon, chiropractor ([facility] in [place]), [facility] locally as well as been evaluated at [facility] [place] in the past.  She reports pain in a bandlike distribution in the lower back which can radiate to the hips, SI joint territory as well as inguinal region.  Pain can be exacerbated when moving from sitting to standing with bending and lifting motions.  Pain is improved with diclofenac tablets, lying supine in alternating hot and cold.  Patient has received steroid injections which sound intramuscular nature every 3 months which gave her short-term relief.  Patient also reports history of receiving Brazilian butt lift in [place] with , which may have exacerbated her symptoms.  Today she denies more distal radiculopathy into the lower extremities or feet, weakness in the lower extremities.  Patient has also trialed tramadol and ibuprofen in the past with ineffective relief.    Patient denies night fever/night sweats, urinary incontinence, bowel incontinence, significant weight loss, significant motor weakness, and loss of sensations.      Pain Disability Index Review:          2/13/2025    11:03 AM   Last 3 PDI Scores   Pain Disability Index (PDI) 40       Non-Pharmacologic Treatments:  Physical Therapy/Home Exercise: yes  Ice/Heat:yes  TENS: no  Acupuncture: no  Massage: yes  Chiropractic: yes    Other: no      Pain Medications:  - Opioids: Ultram (Tramadol HCL)  - Adjuvant Medications: Advil,Motrin ( Ibuprofen), Trokendi    Pain Procedures:   None    Past Medical History:   Diagnosis Date    Allergy     Central centrifugal cicatricial alopecia     Herpes genitalia     Migraine headache     PCOS (polycystic ovarian syndrome)     Shingles      Past Surgical History:   Procedure Laterality Date    ADENOIDECTOMY      FAT TRANSFER  09/2023    BBL with lip    FOOT SURGERY Bilateral 2004    pinky toes removed    REDUCTION OF BOTH BREASTS Bilateral 02/13/2023    TONSILLECTOMY      TOTAL REDUCTION MAMMOPLASTY       Review of patient's allergies indicates:   Allergen Reactions    Fioricet [butalbital-acetaminophen-caff] Other (See Comments)       Current Outpatient Medications   Medication Sig    acyclovir (ZOVIRAX) 800 MG Tab Take 800 mg by mouth 2 (two) times daily.    acyclovir 5% (ZOVIRAX) 5 % ointment Apply topically 6 (six) times daily.    azelastine (ASTELIN) 137 mcg (0.1 %) nasal spray USE 1 SPRAY(S) IN EACH NOSTRIL TWICE DAILY PRN    clindamycin (CLEOCIN T) 1 % external solution Apply topically.    clobetasoL (TEMOVATE) 0.05 % external solution APPLY TO SCALP IN AREAS OF SCALP ITCHING 3 TIMES PER WEEK, CAN ALTERNATE WITH TAC OINTMENT    diclofenac (VOLTAREN) 50 MG EC tablet Take 1 tablet (50 mg total) by mouth 2 (two) times daily.    doxycycline monohydrate 100 mg Tab Take 1 tablet by mouth.    famciclovir (FAMVIR) 250 MG Tab Take 250 mg by mouth 2 (two) times daily.    fluticasone propionate (FLONASE) 50 mcg/actuation nasal spray USE 1 SPRAY(S) IN EACH NOSTRIL TWICE DAILY PRN    ibuprofen (ADVIL,MOTRIN) 800 MG tablet Take 800 mg by mouth every 6 to 8 hours as needed for Pain.     "imiquimod (ALDARA) 5 % cream Apply topically 3 (three) times a week.    linaCLOtide (LINZESS) 72 mcg Cap capsule TAKE 1 CAPSULE BY MOUTH ONCE DAILY 30 MINUTES PRIOR TO FIRST MEAL AS NEEDED FOR CONSTIPATION    ondansetron (ZOFRAN-ODT) 4 MG TbDL Take 1 tablet (4 mg total) by mouth every 6 (six) hours as needed.    pantoprazole (PROTONIX) 40 MG tablet Take 40 mg by mouth 2 (two) times daily.    pediatric multivit-iron-min (FLINTSTONES COMPLETE, IRON,) Chew Take 2 tablets by mouth.    spironolactone (ALDACTONE) 50 MG tablet TAKE 1 TABLET BY MOUTH ONCE DAILY FOR 2 WEEKS THEN INCREASE TO 1 TABLET TWICE DAILY OR 2 TOGETHER AS TOLERATED    sucralfate (CARAFATE) 100 mg/mL suspension Take 1 g by mouth 4 (four) times daily.    traMADoL (ULTRAM) 50 mg tablet Take 50 mg by mouth every 6 to 8 hours as needed for Pain.    triamcinolone acetonide 0.1% (KENALOG) 0.1 % ointment Apply topically.    TROKENDI XR 50 mg Cp24 Take 1 tablet by mouth daily as needed.     valACYclovir (VALTREX) 1000 MG tablet Take 1,000 mg by mouth once daily.     No current facility-administered medications for this visit.         ROS:  GENERAL:  No weight loss, malaise or fevers.  HEENT:   No recent changes in vision or hearing  NECK:  Negative for lumps, no difficulty with swallowing.  RESPIRATORY:  Negative for cough, wheezing or shortness of breath, patient denies any recent URI.  CARDIOVASCULAR:  Negative for chest pain or palpitations.  GI:  Negative for abdominal discomfort, blood in stools or black stools or change in bowel habits.  MUSCULOSKELETAL:  See HPI.  SKIN:  Negative for lesions, rash, and itching.  PSYCH:  No mood disorder or recent psychosocial stressors.   HEMATOLOGY/LYMPHOLOGY:  Negative for prolonged bleeding, bruising easily or swollen nodes.    NEURO:   No history of syncope, paralysis, seizures or tremors.  All other reviewed and negative other than HPI.    OBJECTIVE:    /60   Pulse 86   Resp 17   Ht 5' 4" (1.626 m)   Wt " 79.6 kg (175 lb 7.8 oz)   BMI 30.12 kg/m²       Physical Exam:    GENERAL: Well appearing, in no acute distress, alert and oriented x3.  PSYCH:  Mood and affect appropriate.  SKIN: Skin color, texture, turgor normal, no rashes or lesions.  HEAD/FACE:  Normocephalic, atraumatic. Cranial nerves grossly intact.    CV: RRR with palpation of the radial artery.  PULM: No evidence of respiratory difficulty, symmetric chest rise.  GI:  Soft and non-tender.    BACK:  Exaggerated lumbar lordosis (history of BBL) Straight leg raising in the sitting and supine positions is negative to radicular pain. No pain to palpation over the facet joints of the lumbar spine or spinous processes. Normal range of motion without pain reproduction.  EXTREMITIES: Peripheral joint ROM is full and pain free without obvious instability or laxity in all four extremities. No deformities, edema, or skin discoloration. Good capillary refill.  MUSCULOSKELETAL: Able to stand on heels & toes.   Hip provocative maneuvers are + bilaterally.    SIJ testing: bilateral  - TTP over SI joint: Present  - Demetra's/ Bryson's: Positive    - Sacroiliac Distraction Test (anterior pressure): Positive  - Sacroiliac Compression Test (lateral pressure): Positive   - SacralThrust Test (posterior pressure): Positive       Facet loading test is negative bilaterally.   Bilateral upper and lower extremity strength is normal and symmetric.  No atrophy or tone abnormalities are noted.    RIGHT Lower extremity: Hip flexion 5/5, Hip Abduction 5/5, Hip Adduction 5/5, Knee extension 5/5, Knee flexion 5/5, Ankle dorsiflexion5/5, Extensor hallucis longus 5/5, Ankle plantarflexion 5/5  LEFT Lower extremity:  Hip flexion 5/5, Hip Abduction 5/5,Hip Adduction 5/5, Knee extension 5/5, Knee flexion 5/5, Ankle dorsiflexion 5/5, Extensor hallucis longus 5/5, Ankle plantarflexion 5/5  -Normal testing knee (patellar) jerk and ankle (achilles) jerk    NEURO: Bilateral upper and lower extremity  coordination and muscle stretch reflexes are physiologic and symmetric. No loss of sensation is noted.  GAIT: normal.    Imagin/06/23    X-Ray Lumbar Spine Ap And Lateral    Narrative  EXAMINATION:  XR LUMBAR SPINE AP AND LATERAL    CLINICAL HISTORY:  low back pain;    TECHNIQUE:  AP, lateral and spot images were performed of the lumbar spine.    COMPARISON:  None    FINDINGS:  There are 5 non-rib-bearing lumbar vertebra.  No fracture, compression deformity, disc space narrowing or spondylolisthesis.      22    X-Ray Cervical Spine AP And Lateral    Narrative  EXAMINATION:  XR CERVICAL SPINE AP LATERAL    CLINICAL HISTORY:  Pain cervical (723.1);    COMPARISON:  None.    FINDINGS:  Tip of the odontoid obscured by overlapping structures.  1.2 mm anterior subluxation C4-C5.  Otherwise alignment normal otherwise the alignment is normal.  All cervical vertebral bodies are normal in height.  Minimal disc space narrowing C4-5 and C5-6.  Prevertebral soft tissues are normal.        ASSESSMENT: 44 y.o. year old female with     1. Sacroiliitis  Case Request-RAD/Other Procedure Area: Bilateral Sacroiliac Joint and intra-articular hip joint injection; start prone then flipped supine      2. Primary osteoarthritis of both hips  X-Ray Hips Bilateral 2 View Inc AP Pelvis    Case Request-RAD/Other Procedure Area: Bilateral Sacroiliac Joint and intra-articular hip joint injection; start prone then flipped supine      3. Lumbar spondylosis              PLAN:   - Interventions:  Schedule for bilateral SI joint and intra-articular hip joint injection secondary to pain from sacroiliitis and hip osteoarthritis. Explained the risks and benefits of the procedure in detail with the patient today in clinic along with alternative treatment options, and the patient elected to pursue the intervention at this time.      - Anticoagulation use: No no anticoagulation     report:  Reviewed and consistent with medication use as  prescribed.    - Medications:  -Continue Ibuprofen 800 mg PRN pain. We have discussed potential deleterious side effects of NSAIDs on the cardiovascular, gastrointestinal and renal systems. We have discussed judicious use of this medication. Pt expresses understanding.     - Therapy:   We discussed continuing at home physician directed physical therapy to help manage the patient/s painful condition. The patient was counseled that muscle strengthening will improve the long term prognosis in regards to pain and may also help increase range of motion and mobility.       - Imaging: Reviewed available imaging(x-ray lumbar spine) with patient and answered any questions they had regarding study.  X-ray bilateral hips to better evaluate pain    - Records: Obtain old records from outside physicians and imaging                -[facility]    - Follow up visit: return to clinic in 4-6 weeks status post injection.  Extended visit with Beverly Del Angel PA-C      The above plan and management options were discussed at length with patient. Patient is in agreement with the above and verbalized understanding.    - I discussed the goals of interventional chronic pain management with the patient on today's visit. We discussed a multimodal and systematic approach to pain.  This includes diagnostic and therapeutic injections, adjuvant pharmacologic treatment, physical therapy, and at times psychiatry.  I emphasized the importance of regular exercise, core strengthening and stretching, diet and weight loss as a cornerstone of long-term pain management.    - This condition does not require this patient to take time off of work, and the primary goal of our Pain Management services is to improve the patient's functional capacity.  - Patient Questions: Answered all of the patient's questions regarding diagnoses, therapy, treatment and next steps    Visit today included increased complexity associated with the care of the episodic problem  of chronic pain which was addressed and continue to manage the longitudinal care of the patient due to the serious and/or complex managed problem(s) listed above.      Shira Piedra MD  Interventional Pain Management  [facility]    Disclaimer:  This note was prepared using voice recognition system and is likely to have sound alike errors that may have been overlooked even after proof reading.  Please call me with any questions

## 2025-02-13 ENCOUNTER — OFFICE VISIT (OUTPATIENT)
Dept: PAIN MEDICINE | Facility: CLINIC | Age: 45
End: 2025-02-13
Payer: COMMERCIAL

## 2025-02-13 VITALS
HEART RATE: 86 BPM | HEIGHT: 64 IN | WEIGHT: 175.5 LBS | DIASTOLIC BLOOD PRESSURE: 60 MMHG | BODY MASS INDEX: 29.96 KG/M2 | RESPIRATION RATE: 17 BRPM | SYSTOLIC BLOOD PRESSURE: 125 MMHG

## 2025-02-13 DIAGNOSIS — M16.0 PRIMARY OSTEOARTHRITIS OF BOTH HIPS: ICD-10-CM

## 2025-02-13 DIAGNOSIS — M46.1 SACROILIITIS: Primary | ICD-10-CM

## 2025-02-13 DIAGNOSIS — M47.816 LUMBAR SPONDYLOSIS: ICD-10-CM

## 2025-02-13 PROCEDURE — 3078F DIAST BP <80 MM HG: CPT | Mod: CPTII,S$GLB,, | Performed by: ANESTHESIOLOGY

## 2025-02-13 PROCEDURE — 99204 OFFICE O/P NEW MOD 45 MIN: CPT | Mod: S$GLB,,, | Performed by: ANESTHESIOLOGY

## 2025-02-13 PROCEDURE — 1159F MED LIST DOCD IN RCRD: CPT | Mod: CPTII,S$GLB,, | Performed by: ANESTHESIOLOGY

## 2025-02-13 PROCEDURE — G2211 COMPLEX E/M VISIT ADD ON: HCPCS | Mod: S$GLB,,, | Performed by: ANESTHESIOLOGY

## 2025-02-13 PROCEDURE — 99999 PR PBB SHADOW E&M-EST. PATIENT-LVL V: CPT | Mod: PBBFAC,,, | Performed by: ANESTHESIOLOGY

## 2025-02-13 PROCEDURE — 3008F BODY MASS INDEX DOCD: CPT | Mod: CPTII,S$GLB,, | Performed by: ANESTHESIOLOGY

## 2025-02-13 PROCEDURE — 3074F SYST BP LT 130 MM HG: CPT | Mod: CPTII,S$GLB,, | Performed by: ANESTHESIOLOGY

## 2025-02-13 NOTE — PATIENT INSTRUCTIONS
General Neck and Back Pain    Both neck and back pain are usually caused by injury to the muscles or ligaments of the spine. Sometimes the disks that separate each bone of the spine may cause pain by pressing on a nearby nerve. Back and neck pain may appear after a sudden twisting or bending force (such as in a car accident), or sometimes after a simple awkward movement. In either case, muscle spasm is often present and adds to the pain.  Acute neck and back pain usually gets better in 1 to 2 weeks. Pain related to disk disease, arthritis in the spinal joints or spinal stenosis (narrowing of the spinal canal) can become chronic and last for months or years.  Back and neck pain are common problems. Most people feel better in 1 or 2 weeks, and most of the rest in 1 to 2 months. Most people can remain active.  People experience and describe pain differently.  Pain can be sharp, stabbing, shooting, aching, cramping, or burning  Movement, standing, bending, lifting, sitting, or walking may worsen the pain  Pain can be localized to one spot or area, or it can be more generalized  Pain can spread or radiate upwards, downwards, to the front, or go down your arms  Muscle spasm may occur.  Most of the time mechanical problems with the muscles or spine cause the pain. it is usually caused by an injury, whether known or not, to the muscles or ligaments. While illnesses can cause back pain, it is usually not caused by a serious illness. Pain is usually related to physical activity, whether sports, exercise, work, or normal activity. Sometimes it can occur without an identifiable cause. This can happen simply by stretching or moving wrong, without noting pain at the time. Other causes include:  Overexertion, lifting, pushing, pulling incorrectly or too aggressively.  Sudden twisting, bending or stretching from an accident (car or fall), or accidental movement.  Poor posture  Poor conditioning, lack of regular exercise  Spinal  disc disease or arthritis  Stress  Pregnancy, or illness like appendicitis, bladder or kidney infection, pelvic infections   Home care  For neck pain: Use a comfortable pillow that supports the head and keeps the spine in a neutral position. The position of the head should not be tilted forward or backward.  When in bed, try to find a position of comfort. A firm mattress is best. Try lying flat on your back with pillows under your knees. You can also try lying on your side with your knees bent up towards your chest and a pillow between your knees.  At first, do not try to stretch out the sore spots. If there is a strain, it is not like the good soreness you get after exercising without an injury. In this case, stretching may make it worse.  Avoid prolonged sitting, long car rides or travel. This puts more stress on the lower back than standing or walking.  During the first 24 to 72 hours after an injury, apply an ice pack to the painful area for 20 minutes and then remove it for 20 minutes over a period of 60 to 90 minutes or several times a day.   You can alternate ice and heat therapies. Talk with your healthcare provider about the best treatment for your back or neck pain. As a safety precaution, do not use a heating pad at bedtime. Sleeping with a heating pad can lead to skin burns or tissue damage.  Therapeutic massage can help relax the back and neck muscles without stretching them.  Be aware of safe lifting methods and do not lift anything over 15 pounds until all the pain is gone.  Medications  Talk to your healthcare provider before using medicine, especially if you have other medical problems or are taking other medicines.  You may use over-the-counter medicine to control pain, unless another pain medicine was prescribed. If you have chronic conditions like diabetes, liver or kidney disease, stomach ulcers,  gastrointestinal bleeding, or are taking blood thinner medicines.  Be careful if you are given pain  medicines, narcotics, or medicine for muscle spasm. They can cause drowsiness, and can affect your coordination, reflexes, and judgment. Do not drive or operate heavy machinery.  Follow-up care  Follow up with your healthcare provider, or as advised. Physical therapy or further tests may be needed.  If X-rays were taken, you will be notified of any new findings that may affect your care.  Call 911  Seek emergency medical care if any of the following occur:  Trouble breathing  Confusion  Very drowsy or trouble awakening  Fainting or loss of consciousness  Rapid or very slow heart rate  Loss of bowel or bladder control  When to seek medical advice  Call your healthcare provider right away if any of these occur:  Pain becomes worse or spreads into your arms or legs  Weakness, numbness or pain in one or both arms or legs  Numbness in the groin area  Difficulty walking  Fever of 100.4ºF (38ºC) or higher, or as directed by your healthcare provider  Date Last Reviewed: 7/1/2016  © 0897-5773 [employer]. [address] [place] [address]. All rights reserved. This information is not intended as a substitute for professional medical care. Always follow your healthcare professional's instructions.       Exercises to Strengthen Your Lower Back  Strong lower back and abdominal muscles work together to support your spine. The exercises below will help strengthen the lower back. It is important that you begin exercising slowly and increase levels gradually.  Always begin any exercise program with stretching. If you feel pain while doing any of these exercises, stop and talk to your doctor about a more specific exercise program that better suits your condition.   Low back stretch  The point of stretching is to make you more flexible and increase your range of motion. Stretch only as much as you are able. Stretch slowly. Do not push your stretch to the limit. If at any point you feel pain while stretching,  this is your (temporary) limit.  Lie on your back with your knees bent and both feet on the ground.  Slowly raise your left knee to your chest as you flatten your lower back against the floor. Hold for 5 seconds.  Relax and repeat the exercise with your right knee.  Do 10 of these exercises for each leg.  Repeat hugging both knees to your chest at the same time.  Building lower back strength  Start your exercise routine with 10 to 30 minutes a day, 1 to 3 times a day.  Initial exercises  Lying on your back:  Ankle pumps: Move your foot up and down, towards your head, and then away. Repeat 10 times with each foot.  Heel slides: Slowly bend your knee, drawing the heel of your foot towards you. Then slide your heel/foot from you, straightening your knee. Do not lift your foot off the floor (this is not a leg lift).  Abdominal contraction: Bend your knees and put your hands on your stomach. Tighten your stomach muscles. Hold for 5 seconds, then relax. Repeat 10 times.  Straight leg raise: Bend one leg at the knee and keep the other leg straight. Tighten your stomach muscles. Slowly lift your straight leg 6 to 12 inches off the floor and hold for up to 5 seconds. Repeat 10 times on each side.  Standing:  Wall squats: Stand with your back against the wall. Move your feet about 12 inches away from the wall. Tighten your stomach muscles, and slowly bend your knees until they are at about a 45 degree angle. Do not go down too far. Hold about 5 seconds. Then slowly return to your starting position. Repeat 10 times.  Heel raises: Stand facing the wall. Slowly raise the heels of your feet up and down, while keeping your toes on the floor. If you have trouble balancing, you can touch the wall with your hands. Repeat 10 times.  More advanced exercises  When you feel comfortable enough, try these exercises.  Kneeling lumbar extension: Begin on your hands and knees. At the same time, raise and straighten your right arm and left leg  until they are parallel to the ground. Hold for 2 seconds and come back slowly to a starting position. Repeat with left arm and right leg, alternating 10 times.  Prone lumbar extension: Lie face down, arms extended overhead, palms on the floor. At the same time, raise your right arm and left leg as high as comfortably possible. Hold for 10 seconds and slowly return to start. Repeat with left arm and right leg, alternating 10 times. Gradually build up to 20 times. (Advanced: Repeat this exercise raising both arms and both legs a few inches off the floor at the same time. Hold for 5 seconds and release.)  Pelvic tilt: Lie on the floor on your back with your knees bent at 90 degrees. Your feet should be flat on the floor. Inhale, exhale, then slowly contract your abdominal muscles bringing your navel toward your spine. Let your pelvis rock back until your lower back is flat on the floor. Hold for 10 seconds while breathing smoothly.  Abdominal crunch: Perform a pelvic tilt (above) flattening your lower back against the floor. Holding the tension in your abdominal muscles, take another breath and raise your shoulder blades off the ground (this is not a full sit-up). Keep your head in line with your body (dont bend your neck forward). Hold for 2 seconds, then slowly lower.  Date Last Reviewed: 6/1/2016  © 2066-7440 [employer]. [address] [place] [address], [place] [address]. All rights reserved. This information is not intended as a substitute for professional medical care. Always follow your healthcare professional's instructions.

## 2025-02-19 ENCOUNTER — OFFICE VISIT (OUTPATIENT)
Dept: GASTROENTEROLOGY | Facility: CLINIC | Age: 45
End: 2025-02-19
Payer: COMMERCIAL

## 2025-02-19 ENCOUNTER — PATIENT MESSAGE (OUTPATIENT)
Dept: GASTROENTEROLOGY | Facility: CLINIC | Age: 45
End: 2025-02-19

## 2025-02-19 ENCOUNTER — HOSPITAL ENCOUNTER (OUTPATIENT)
Dept: RADIOLOGY | Facility: HOSPITAL | Age: 45
Discharge: HOME | End: 2025-02-19
Attending: ANESTHESIOLOGY
Payer: COMMERCIAL

## 2025-02-19 VITALS
DIASTOLIC BLOOD PRESSURE: 75 MMHG | HEART RATE: 78 BPM | SYSTOLIC BLOOD PRESSURE: 121 MMHG | WEIGHT: 171.44 LBS | BODY MASS INDEX: 29.27 KG/M2 | HEIGHT: 64 IN

## 2025-02-19 DIAGNOSIS — M16.0 PRIMARY OSTEOARTHRITIS OF BOTH HIPS: ICD-10-CM

## 2025-02-19 DIAGNOSIS — Z90.3 S/P GASTRIC SLEEVE PROCEDURE: ICD-10-CM

## 2025-02-19 DIAGNOSIS — R10.9 ABDOMINAL CRAMPING: Primary | ICD-10-CM

## 2025-02-19 DIAGNOSIS — R10.13 EPIGASTRIC PAIN: ICD-10-CM

## 2025-02-19 DIAGNOSIS — K58.1 IRRITABLE BOWEL SYNDROME WITH CONSTIPATION: ICD-10-CM

## 2025-02-19 PROCEDURE — 73521 X-RAY EXAM HIPS BI 2 VIEWS: CPT | Mod: TC

## 2025-02-19 NOTE — PATIENT INSTRUCTIONS
02/19/2025    Dear Thomas Naranjo,    We are writing to express our heartfelt gratitude for choosing us as your healthcare provider and entrusting us with your well-being. Your health and satisfaction are our top priorities, and we are truly honored to have the opportunity to serve you.    At [facility], we strive to provide the best possible care and support for our patients. My assessment and recommendations are as follows:    Abdominal cramping  -     CT Abdomen Pelvis With IV Contrast Oral Contrast for GI Bypass; Future; Expected date: 02/19/2025  -     CBC Auto Differential; Future; Expected date: 02/19/2025  -     Basic Metabolic Panel; Future; Expected date: 02/19/2025  -     Hepatic Function Panel; Future; Expected date: 02/19/2025  -     Occult blood x 1, stool; Future; Expected date: 02/19/2025    Irritable bowel syndrome with constipation    Epigastric pain    S/P gastric sleeve procedure  -     CT Abdomen Pelvis With IV Contrast Oral Contrast for GI Bypass; Future; Expected date: 02/19/2025    We recommend the following:  Psyllium husk daily.  Magnesium Glycinate 1,000 mg per day.   Ground Flaxseed daily.  Probiotics (Florastor or align) daily.     To support optimal gut health and regular bowel movements, we recommend a daily regimen that includes psyllium husk, probiotics, magnesium capsules, and ground flaxseed, along with adequate hydration. Psyllium husk is a soluble fiber that aids digestion and promotes stool regularity, while probiotics help maintain a healthy gut microbiome. Magnesium supports muscle relaxation, including the intestinal muscles, to ease bowel movements. Ground flaxseed provides additional fiber and beneficial omega-3 fatty acids for digestive health. It is essential to drink plenty of water throughout the day to ensure these supplements work effectively and to prevent constipation. Please follow the recommended dosages and consult your provider if you have any concerns or  "experience any changes in your digestive health. Lifestyle modifications for reflux discussed with the patient. These include weight loss, proper elevation of the head of the bed, avoiding foods that trigger reflux, avoiding late meals and staying upright for at least 1.5 hours after meals.    We genuinely value your feedback and believe that it plays a crucial role in our pursuit of excellence. We kindly request you to take a few minutes of your time to share your experience with us by posting a Google review. Your honest thoughts and opinions can make a significant difference for others seeking healthcare services and will help us better understand how we can further enhance our patient care.    Your kind words and positive reviews will not only motivate our dedicated team but will also inspire confidence in potential patients who are looking for exceptional healthcare services.    Once again, we extend our sincere appreciation for giving us the opportunity to serve you. If there is anything else we can do to improve your experience or assist you further, please do not hesitate to reach out to us.    Thank you for being part of our [facility] family, and we look forward to continuing to provide you with the best care possible.    Thanks for trusting us with your healthcare needs and using [facility]. If you want to ask us a question, you can do so by replying to this message or by calling [phone].    Sincerely,    Mazin Zamora M.D.    PS: At [facility] we offer virtual visits. Please use your [facility] dinesh to schedule a virtual visit.     To rate your experience with Dr. Zamora please click on the link below:    [email or web address]    To post a review, simply follow these quick steps:  1. Visit [facility] or search for my name on Google.  2. Click on the "Write a review" button on the left-hand side of the page.  3. Rate your experience by choosing the " number of stars that reflect your satisfaction.  4. Share your thoughts and insights about your visit - we'd love to hear your feedback!

## 2025-02-19 NOTE — PROGRESS NOTES
[facility]  Gastroenterology    Patient evaluated at the request of Kirsten Lee Md  [address] [place] [address]     PCP: No, Primary Doctor    Chief Complaint    Abdominal Cramping; Abdominal Pain       History of present illness/subjective  Abdominal Pain: Patient complains of abdominal pain. The pain is located in the epigastrium and in the periumbilical area. The pain is described as colicky and cramping, and is 9/10 in intensity. Onset was several months ago. Symptoms have been unchanged since. Aggravating factors include movement.  Alleviating factors include none. Associated symptoms include arthralgias, constipation, and back pains. The patient denies anorexia, chills, diarrhea, dysuria, fever, frequency, headache, hematochezia, melena, nausea, and vomiting. Patient had a gastric sleeve in 2021. Normal Colonoscopy in 2020. Normal EGD prior to surgery.       Past Medical History:   Diagnosis Date    Allergy     Central centrifugal cicatricial alopecia     Herpes genitalia     Migraine headache     PCOS (polycystic ovarian syndrome)     Shingles        Past Surgical History:   Procedure Laterality Date    ADENOIDECTOMY      BARIATRIC SURGERY  2021    Gastric Sleeve    COLONOSCOPY  08/2020    FAT TRANSFER  09/2023    BBL with lip    FOOT SURGERY Bilateral 2004    pinky toes removed    REDUCTION OF BOTH BREASTS Bilateral 02/13/2023    TONSILLECTOMY      TOTAL REDUCTION MAMMOPLASTY      UPPER GASTROINTESTINAL ENDOSCOPY  2021       Medications Ordered Prior to Encounter[1]    Review of patient's allergies indicates:   Allergen Reactions    Fioricet [butalbital-acetaminophen-caff] Other (See Comments)       Social History[2]    Family History   Problem Relation Name Age of Onset    Breast cancer Other maternal great aunt     Colon cancer Neg Hx      Ovarian cancer Neg Hx      Uterine cancer Neg Hx         Vitals:    02/19/25 1434   BP: 121/75   Pulse: 78   Weight: 77.7 kg (171  "lb 6.5 oz)   Height: 5' 4" (1.626 m)     Body mass index is 29.42 kg/m².    Physical Exam  Constitutional:       Appearance: She is well-developed.   HENT:      Head: Normocephalic and atraumatic.   Eyes:      Pupils: Pupils are equal, round, and reactive to light.   Neck:      Thyroid: No thyromegaly.   Cardiovascular:      Rate and Rhythm: Normal rate and regular rhythm.      Heart sounds: Normal heart sounds. No murmur heard.  Pulmonary:      Effort: Pulmonary effort is normal. No respiratory distress.      Breath sounds: Normal breath sounds. No wheezing or rales.   Abdominal:      General: Bowel sounds are normal. There is no distension.      Palpations: Abdomen is soft. There is no mass.      Tenderness: There is no abdominal tenderness.   Musculoskeletal:         General: No tenderness. Normal range of motion.      Cervical back: Normal range of motion and neck supple.   Lymphadenopathy:      Cervical: No cervical adenopathy.   Skin:     General: Skin is warm and dry.      Findings: No erythema.   Neurological:      Mental Status: She is alert and oriented to person, place, and time.      Cranial Nerves: No cranial nerve deficit.      Deep Tendon Reflexes: Reflexes are normal and symmetric.   Psychiatric:         Behavior: Behavior normal.         Lab Results   Component Value Date    WBC 4.01 09/03/2023    HGB 10.8 (L) 09/03/2023    HCT 34.3 (L) 09/03/2023    MCV 90 09/03/2023     09/03/2023         BMP  Lab Results   Component Value Date     09/03/2023    K 4.4 09/03/2023     09/03/2023    CO2 25 09/03/2023    BUN 13 09/03/2023    CREATININE 1.0 09/03/2023    CALCIUM 8.6 (L) 09/03/2023    ANIONGAP 8 09/03/2023    EGFRNORACEVR >60 09/03/2023       Lab Results   Component Value Date    ALT 10 09/03/2023    AST 16 09/03/2023    ALKPHOS 50 (L) 09/03/2023    BILITOT 0.7 09/03/2023       Lab Results   Component Value Date    LIPASE 22 06/12/2015       ASSESSMENT AND RECOMMENDATIONS     1. " Abdominal cramping  -     CT Abdomen Pelvis With IV Contrast Oral Contrast for GI Bypass; Future; Expected date: 02/19/2025  -     CBC Auto Differential; Future; Expected date: 02/19/2025  -     Basic Metabolic Panel; Future; Expected date: 02/19/2025  -     Hepatic Function Panel; Future; Expected date: 02/19/2025  -     Occult blood x 1, stool; Future; Expected date: 02/19/2025    2. Irritable bowel syndrome with constipation    3. Epigastric pain    4. S/P gastric sleeve procedure  -     CT Abdomen Pelvis With IV Contrast Oral Contrast for GI Bypass; Future; Expected date: 02/19/2025    We recommend the following:  Psyllium husk daily.  Magnesium Glycinate 1,000 mg per day.   Ground Flaxseed daily.  Probiotics (Florastor or align) daily.     To support optimal gut health and regular bowel movements, we recommend a daily regimen that includes psyllium husk, probiotics, magnesium capsules, and ground flaxseed, along with adequate hydration. Psyllium husk is a soluble fiber that aids digestion and promotes stool regularity, while probiotics help maintain a healthy gut microbiome. Magnesium supports muscle relaxation, including the intestinal muscles, to ease bowel movements. Ground flaxseed provides additional fiber and beneficial omega-3 fatty acids for digestive health. It is essential to drink plenty of water throughout the day to ensure these supplements work effectively and to prevent constipation. Please follow the recommended dosages and consult your provider if you have any concerns or experience any changes in your digestive health. Lifestyle modifications for reflux discussed with the patient. These include weight loss, proper elevation of the head of the bed, avoiding foods that trigger reflux, avoiding late meals and staying upright for at least 1.5 hours after meals.    The referring provider will be notified that our consultation is complete and available through the patient's records.    Thanks for  letting us participate in the care of this nice patient,      Mazin Zamora               [1]   Current Outpatient Medications on File Prior to Visit   Medication Sig Dispense Refill    acyclovir (ZOVIRAX) 800 MG Tab Take 800 mg by mouth 2 (two) times daily.      azelastine (ASTELIN) 137 mcg (0.1 %) nasal spray USE 1 SPRAY(S) IN EACH NOSTRIL TWICE DAILY PRN      clindamycin (CLEOCIN T) 1 % external solution Apply topically.      clobetasoL (TEMOVATE) 0.05 % external solution APPLY TO SCALP IN AREAS OF SCALP ITCHING 3 TIMES PER WEEK, CAN ALTERNATE WITH TAC OINTMENT      diclofenac (VOLTAREN) 50 MG EC tablet Take 1 tablet (50 mg total) by mouth 2 (two) times daily. 20 tablet 0    doxycycline monohydrate 100 mg Tab Take 1 tablet by mouth.      famciclovir (FAMVIR) 250 MG Tab Take 250 mg by mouth 2 (two) times daily.      fluticasone propionate (FLONASE) 50 mcg/actuation nasal spray USE 1 SPRAY(S) IN EACH NOSTRIL TWICE DAILY PRN      ibuprofen (ADVIL,MOTRIN) 800 MG tablet Take 800 mg by mouth every 6 to 8 hours as needed for Pain.      imiquimod (ALDARA) 5 % cream Apply topically 3 (three) times a week. 24 packet 1    linaCLOtide (LINZESS) 72 mcg Cap capsule TAKE 1 CAPSULE BY MOUTH ONCE DAILY 30 MINUTES PRIOR TO FIRST MEAL AS NEEDED FOR CONSTIPATION      ondansetron (ZOFRAN-ODT) 4 MG TbDL Take 1 tablet (4 mg total) by mouth every 6 (six) hours as needed. 30 tablet 0    pediatric multivit-iron-min (FLINTSTONES COMPLETE, IRON,) Chew Take 2 tablets by mouth.      spironolactone (ALDACTONE) 50 MG tablet TAKE 1 TABLET BY MOUTH ONCE DAILY FOR 2 WEEKS THEN INCREASE TO 1 TABLET TWICE DAILY OR 2 TOGETHER AS TOLERATED      sucralfate (CARAFATE) 100 mg/mL suspension Take 1 g by mouth 4 (four) times daily.      traMADoL (ULTRAM) 50 mg tablet Take 50 mg by mouth every 6 to 8 hours as needed for Pain.      triamcinolone acetonide 0.1% (KENALOG) 0.1 % ointment Apply topically.      TROKENDI XR 50 mg Cp24 Take 1 tablet by mouth daily as  needed.       valACYclovir (VALTREX) 1000 MG tablet Take 1,000 mg by mouth once daily.      pantoprazole (PROTONIX) 40 MG tablet Take 40 mg by mouth 2 (two) times daily.      [DISCONTINUED] acyclovir 5% (ZOVIRAX) 5 % ointment Apply topically 6 (six) times daily. (Patient not taking: Reported on 2/19/2025) 30 g 0     No current facility-administered medications on file prior to visit.   [2]   Social History  Socioeconomic History    Marital status: Single   Tobacco Use    Smoking status: Never    Smokeless tobacco: Never   Substance and Sexual Activity    Alcohol use: Yes     Comment: occasionally    Drug use: Never    Sexual activity: Yes     Partners: Male     Birth control/protection: None

## 2025-02-20 ENCOUNTER — RESULTS FOLLOW-UP (OUTPATIENT)
Dept: GASTROENTEROLOGY | Facility: CLINIC | Age: 45
End: 2025-02-20
Payer: COMMERCIAL

## 2025-02-20 NOTE — PROGRESS NOTES
Subject: Test Results     02/20/2025    Dear Thomas Naranjo,    I hope this message finds you in good health. I am pleased to inform you that the recent tests you underwent have returned with normal and negative results.     This is great news, and it reaffirms your commitment to maintaining good health. Keep up the good work!    Additionally, I want to take this opportunity to express my gratitude for choosing our healthcare services. We always strive to provide the best care possible, and your feedback is valuable to us. If you feel comfortable, I encourage you to share your experience with us by leaving a Google review. Your honest review can help others seeking healthcare services make informed decisions and inspire us to continuously improve our care.    If you have any questions or concerns about your results or anything else related to your health, please don't hesitate to reach out. We are here to support you on your health journey.    Thank you for entrusting us with your care, and we look forward to serving you in the future.    Best regards,    Mazin Zamora  Gastroenterology Department  [facility]  [phone]

## 2025-02-28 ENCOUNTER — OFFICE VISIT (OUTPATIENT)
Dept: OTOLARYNGOLOGY | Facility: CLINIC | Age: 45
End: 2025-02-28
Payer: COMMERCIAL

## 2025-02-28 VITALS — BODY MASS INDEX: 29.42 KG/M2 | HEIGHT: 64 IN

## 2025-02-28 DIAGNOSIS — J32.9 CHRONIC SINUSITIS, UNSPECIFIED LOCATION: Primary | ICD-10-CM

## 2025-02-28 DIAGNOSIS — R51.9 SINUS HEADACHE: ICD-10-CM

## 2025-02-28 DIAGNOSIS — R04.0 EPISTAXIS: ICD-10-CM

## 2025-02-28 DIAGNOSIS — J34.3 HYPERTROPHY OF INFERIOR NASAL TURBINATE: ICD-10-CM

## 2025-02-28 PROCEDURE — 99999 PR PBB SHADOW E&M-EST. PATIENT-LVL III: CPT | Mod: PBBFAC,,, | Performed by: PHYSICIAN ASSISTANT

## 2025-02-28 NOTE — PROGRESS NOTES
Subjective     Patient ID: Thomas Naranjo is a 44 y.o. female.    Chief Complaint: Other (Patient presents to the clinic to day with c/o nosebleeds, eye pressure, and headaches ongoing since the end of January. She states she does have a history of migraines.)    Patient is a pleasant 44 year old female here to see me today for the first time for evaluation of headaches and nose bleeds.  Reports having hx of migraines but is unsure whether her current headaches are migraine or sinus-related.  Describes pressure around and behind the right eye.  No purulent nasal drainage.  She has nasal congestion bilaterally but denies nasal obstruction.  She had previous turbinate reduction in 2021 in [place] which helped her headaches at that time.  No fever or dental pain.  Her neurologist is Dr. Connelly.    She reports at least 4 sinus infections treated with antibiotics in the past 12 months; last with Zithromax.  She is currently on Doxycycline for alopecia.    She does not smoke.    She uses Flonase and Astelin PRN (not consistently or even weekly).  She uses humidifier by her bed.    She also mentions 3 nosebleeds last month.  Anterior bleeding from both nostrils.  Lasted several minutes.  Stopped with pressure.  No issues with HTN.  No previous blood transfusions.  She is not on any blood thinners.          Review of Systems   Constitutional: Negative.  Negative for fever.   HENT:  Positive for nasal congestion, nosebleeds and sinus pressure/congestion. Negative for ear discharge, ear pain, postnasal drip, rhinorrhea and sore throat.    Eyes: Negative.    Respiratory: Negative.     Cardiovascular: Negative.    Endocrine: Positive for cold intolerance.   Genitourinary: Negative.    Musculoskeletal:  Positive for back pain.   Integumentary:  Negative.   Neurological:  Positive for headaches.   Hematological: Negative.    Psychiatric/Behavioral: Negative.            Objective     Physical Exam  Constitutional:        Appearance: Normal appearance.   HENT:      Head: Normocephalic and atraumatic.      Right Ear: Tympanic membrane, ear canal and external ear normal. No middle ear effusion. Tympanic membrane is not injected.      Left Ear: Tympanic membrane, ear canal and external ear normal.  No middle ear effusion. Tympanic membrane is not injected.      Nose: Mucosal edema and congestion present. No rhinorrhea.      Right Nostril: No epistaxis.      Left Nostril: No epistaxis.      Right Turbinates: Enlarged (touches septum).      Left Turbinates: Not enlarged.      Right Sinus: Maxillary sinus tenderness and frontal sinus tenderness present.      Left Sinus: No maxillary sinus tenderness or frontal sinus tenderness.      Comments: Able to see left middle turbinate       Mouth/Throat:      Mouth: Mucous membranes are moist.      Pharynx: Oropharynx is clear. No posterior oropharyngeal erythema.   Eyes:      Pupils: Pupils are equal, round, and reactive to light.   Pulmonary:      Effort: Pulmonary effort is normal.   Neurological:      General: No focal deficit present.      Mental Status: She is alert.   Psychiatric:         Behavior: Behavior is cooperative.            Assessment and Plan     1. Chronic sinusitis, unspecified location  -     CT Sinuses without Contrast; Future; Expected date: 02/28/2025    2. Sinus headache    3. Hypertrophy of inferior nasal turbinate    4. Epistaxis        At this point, the patient has been on multiple rounds of antibiotics including Azithromycin and Doxycycline  without significant or sustained improvement in their symptoms.  I would recommend a CT of the sinuses for further evaluation.  If the scan shows persistent sinus disease, she will then need a longer course of antibiotics, likely 14 days of Augmentin and she requests Diflucan as well if antibiotics are needed.  If the scan does not show persistent infection, the patient's symptoms are likely due to migraines and I would recommend  she followup with her neurologist.  The patient understands this treatment plan, and will emails with results when available.    Epistaxis:  No obvious ectatic vessels on exam.  Recommend she continue to increase her nasal moisture, including the use of saline spray throughout the day and a humidifier at night. In addition, she may use Afrin as needed for active bleeding.  She has not had nosebleed since last month.  Discussed that she should RTC with MD if epistaxis recurs.    Turbinate Hypertrophy:   Recommend consistent use of her steroid nasal spray instead of PRN.  We discussed in detail the proper mechanism of use directing the spray away from the nasal septum.  In addition, we also discussed that it will take two to three weeks of daily use to achieve maximal effectiveness.  Recheck with MD if not improving to see whether repeat turbinate reduction is needed.               No follow-ups on file.    Answers submitted by the patient for this visit:  Review of Symptoms Questionnaire  (Submitted on 2/28/2025)  heartburn: Yes  Acid Reflux?: Yes  Seasonal Allergies?: Yes

## 2025-03-03 ENCOUNTER — HOSPITAL ENCOUNTER (OUTPATIENT)
Dept: RADIOLOGY | Facility: HOSPITAL | Age: 45
Discharge: HOME | End: 2025-03-03
Attending: INTERNAL MEDICINE
Payer: COMMERCIAL

## 2025-03-03 DIAGNOSIS — Z90.3 S/P GASTRIC SLEEVE PROCEDURE: ICD-10-CM

## 2025-03-03 DIAGNOSIS — R10.9 ABDOMINAL CRAMPING: ICD-10-CM

## 2025-03-03 PROCEDURE — 25500020 PHARM REV CODE 255: Performed by: INTERNAL MEDICINE

## 2025-03-03 PROCEDURE — 74177 CT ABD & PELVIS W/CONTRAST: CPT | Mod: TC

## 2025-03-03 PROCEDURE — A9698 NON-RAD CONTRAST MATERIALNOC: HCPCS | Performed by: INTERNAL MEDICINE

## 2025-03-03 PROCEDURE — 74177 CT ABD & PELVIS W/CONTRAST: CPT | Mod: 26,,, | Performed by: RADIOLOGY

## 2025-03-03 RX ADMIN — IOHEXOL 500 ML: 9 SOLUTION ORAL at 11:03

## 2025-03-03 RX ADMIN — IOHEXOL 100 ML: 350 INJECTION, SOLUTION INTRAVENOUS at 11:03

## 2025-03-03 NOTE — PRE-PROCEDURE INSTRUCTIONS
Spoke with patient regarding procedure scheduled on 3.11     Arrival time 0845     Has patient been sick with fever or on antibiotics within the last 7 days? YES prophylactic-alopecia     Does the patient have any open wounds, sores or rashes? No     Does the patient have any recent fractures? no     Has patient received a vaccination within the last 7 days? No     Received the COVID vaccination? yes     Has the patient stopped all medications as directed? na     Does patient have a pacemaker, defibrillator, or implantable stimulator? No     Does the patient have a ride to and from procedure and someone reliable to remain with patient?  MOTHER     Is the patient diabetic? no      Does the patient have sleep apnea? Or use O2 at home? no     Is the patient receiving sedation? Yes      Is the patient instructed to remain NPO beginning at midnight the night before their procedure? yes     Procedure location confirmed with patient? Yes     Covid- Denies signs/symptoms. Instructed to notify PAT/MD if any changes.

## 2025-03-05 ENCOUNTER — LAB VISIT (OUTPATIENT)
Dept: LAB | Facility: HOSPITAL | Age: 45
End: 2025-03-05
Attending: INTERNAL MEDICINE
Payer: COMMERCIAL

## 2025-03-05 DIAGNOSIS — R10.9 ABDOMINAL CRAMPING: ICD-10-CM

## 2025-03-05 LAB — OB PNL STL: NEGATIVE

## 2025-03-05 PROCEDURE — 82272 OCCULT BLD FECES 1-3 TESTS: CPT | Performed by: INTERNAL MEDICINE

## 2025-03-11 ENCOUNTER — HOSPITAL ENCOUNTER (OUTPATIENT)
Facility: HOSPITAL | Age: 45
Discharge: HOME | End: 2025-03-11
Attending: ANESTHESIOLOGY | Admitting: ANESTHESIOLOGY
Payer: COMMERCIAL

## 2025-03-11 VITALS
RESPIRATION RATE: 15 BRPM | HEART RATE: 75 BPM | HEIGHT: 64 IN | WEIGHT: 171.94 LBS | SYSTOLIC BLOOD PRESSURE: 114 MMHG | OXYGEN SATURATION: 100 % | TEMPERATURE: 98 F | DIASTOLIC BLOOD PRESSURE: 63 MMHG | BODY MASS INDEX: 29.35 KG/M2

## 2025-03-11 DIAGNOSIS — M46.1 SACROILIITIS: ICD-10-CM

## 2025-03-11 PROBLEM — M16.0 PRIMARY OSTEOARTHRITIS OF BOTH HIPS: Status: ACTIVE | Noted: 2025-03-11

## 2025-03-11 LAB
B-HCG UR QL: NEGATIVE
CTP QC/QA: YES

## 2025-03-11 PROCEDURE — 20610 DRAIN/INJ JOINT/BURSA W/O US: CPT | Mod: 59,LT | Performed by: ANESTHESIOLOGY

## 2025-03-11 PROCEDURE — 77002 NEEDLE LOCALIZATION BY XRAY: CPT | Mod: 26,59,, | Performed by: ANESTHESIOLOGY

## 2025-03-11 PROCEDURE — 27096 INJECT SACROILIAC JOINT: CPT | Mod: LT | Performed by: ANESTHESIOLOGY

## 2025-03-11 PROCEDURE — 25000003 PHARM REV CODE 250: Performed by: ANESTHESIOLOGY

## 2025-03-11 PROCEDURE — 20610 DRAIN/INJ JOINT/BURSA W/O US: CPT | Mod: 59,LT,, | Performed by: ANESTHESIOLOGY

## 2025-03-11 PROCEDURE — 25500020 PHARM REV CODE 255: Performed by: ANESTHESIOLOGY

## 2025-03-11 PROCEDURE — 63600175 PHARM REV CODE 636 W HCPCS: Performed by: ANESTHESIOLOGY

## 2025-03-11 PROCEDURE — 27096 INJECT SACROILIAC JOINT: CPT | Mod: LT,,, | Performed by: ANESTHESIOLOGY

## 2025-03-11 RX ORDER — TRIAMCINOLONE ACETONIDE 40 MG/ML
INJECTION, SUSPENSION INTRA-ARTICULAR; INTRAMUSCULAR
Status: DISCONTINUED | OUTPATIENT
Start: 2025-03-11 | End: 2025-03-11 | Stop reason: HOSPADM

## 2025-03-11 RX ORDER — MIDAZOLAM HYDROCHLORIDE 1 MG/ML
INJECTION, SOLUTION INTRAMUSCULAR; INTRAVENOUS
Status: DISCONTINUED | OUTPATIENT
Start: 2025-03-11 | End: 2025-03-11 | Stop reason: HOSPADM

## 2025-03-11 RX ORDER — FENTANYL CITRATE 50 UG/ML
INJECTION, SOLUTION INTRAMUSCULAR; INTRAVENOUS
Status: DISCONTINUED | OUTPATIENT
Start: 2025-03-11 | End: 2025-03-11 | Stop reason: HOSPADM

## 2025-03-11 RX ORDER — SODIUM BICARBONATE 1 MEQ/ML
SYRINGE (ML) INTRAVENOUS
Status: DISCONTINUED | OUTPATIENT
Start: 2025-03-11 | End: 2025-03-11 | Stop reason: HOSPADM

## 2025-03-11 RX ORDER — BUPIVACAINE HYDROCHLORIDE 2.5 MG/ML
INJECTION, SOLUTION EPIDURAL; INFILTRATION; INTRACAUDAL; PERINEURAL
Status: DISCONTINUED | OUTPATIENT
Start: 2025-03-11 | End: 2025-03-11 | Stop reason: HOSPADM

## 2025-03-11 NOTE — PLAN OF CARE
Pt verbalized understanding of discharge instructions. Bandaids x 4 to lower back and hips c/d/i. Patient voiced no complaints, with no further questions at this time. Patient stood at side of bed, walked steps with no new motor deficits. VSS on RA. Recovery care complete.

## 2025-03-11 NOTE — OP NOTE
Vitals:    02/23/22 0805   BP: (!) 168/93   Pulse: (!) 111   Resp: (!) 22   Temp:        Procedure Date:03/11/2025      INFORMED CONSENT: The procedure, risks, benefits and options were discussed with patient. There are no contraindications to the procedure. The patient expressed understanding and agreed to proceed. The personnel performing the procedure was discussed. I verify that I personally obtained consent prior to the start of the procedure and the signed consent can be found on the patient's chart.       Anesthesia:   Conscious sedation provided by M.D    The patient was monitored with continuous pulse oximetry, EKG, and intermittent blood pressure monitors.  The patient was hemodynamically stable throughout the entire process was responsive to voice, and breathing spontaneously.  Supplemental O2 was provided at 2L/min via nasal cannula.  Patient was comfortable for the duration of the procedure. (See nurse documentation and case log for sedation time)    There was a total of 2mg IV Midazolam and 25mcg Fentanyl titrated for the procedure    Pre Procedure diagnosis: Sacroiliitis [M46.1]  Post-Procedure diagnosis: SAME      PROCEDURE:  Left sacroiliac joint injection                          Left Hip (acetabulofemoral) joint injection under fluoroscopic guidance    REASON FOR PROCEDURE:   Sacroiliitis [M46.1]  osteoarthritis of the hip (of the above noted laterality)    MEDICATIONS INJECTED: 1mL 40mg/ml Kenalog and 4mL Bupivacaine 0.25% into each site    LOCAL ANESTHETIC USED: Xylocaine 1% 6ml     ESTIMATED BLOOD LOSS: None.   COMPLICATIONS: None.     TECHNIQUE:   Sacroiliac joint injection:   Laying in the prone position, the patient was prepped and draped in the usual sterile fashion using ChloraPrep and fenestrated drape.  The area was determined under fluoroscopy.  Local Xylocaine was injected by raising a wheel and going down to the periosteum using a 27-gauge hypodermic needle.  The 3.5 inch 22-gauge  spinal needle was introduce into the Left sacroiliac joint.  Negative pressure applied to confirm no intravascular placement.  Omnipaque was injected to confirm placement and to confirm that there was no vascular runoff.  The medication was then injected slowly.  The patient tolerated the procedure well.                       Hip Intraarticular Injection:   The patient was then repositioned on the table in supine orientation.. The area over the above noted joint/s was widely prepped with ChloraPrep and drapped in usual sterile fashion.    The above noted joint/s was identified on fluoroscopy. A 27-gauge 1.5 inch needle was used to localize the site of entry with 3 mL of 1% PF Lidocaine. A 22 gauge 3.5 inch spinal needle was then introduced and advanced towards the neck of the femur. The target site was approximately 0.5 to 1.0 cm distal to the lateral border of the femoral head and 0.5 to 1.0 cm below the superior aspect of the femoral neck. The needle was advanced until osseus interface was met. Following negative aspiration, a solution containing 4 mL of 0.25% Bupivacaine and 1 mL of Triamcinolone (40 mg/mL) was then injected. There was minimal resistance encountered throughout injection. No paresthesias were noted on injection. The needle stylet was replaced and the needle was removed intact. The patient tolerated the procedure well. A bandage was applied to the site of injection.    The patient was monitored for approximately 30 minutes after the procedure. Patient was given post procedure and discharge instructions to follow at home. We will see the patient back in two weeks or the patient may call to inform of status. The patient was discharged in a stable condition

## 2025-03-11 NOTE — DISCHARGE SUMMARY
Discharge Note  Short Stay      SUMMARY     Admit Date: 3/11/2025    Attending Physician: Shira Piedra MD        Discharge Physician: Shira Piedra MD        Discharge Date: 3/11/2025 11:54 AM    Procedure(s) (LRB):  Bilateral Sacroiliac Joint and intra-articular hip joint injection; start prone then flipped supine (Bilateral)    Final Diagnosis: Sacroiliitis [M46.1]  Primary osteoarthritis of both hips [M16.0]    Disposition: Home or self care    Patient Instructions:   Discharge Medication List as of 3/11/2025  9:12 AM        CONTINUE these medications which have NOT CHANGED    Details   pantoprazole (PROTONIX) 40 MG tablet Take 40 mg by mouth 2 (two) times daily., Starting Sat 5/21/2022, Historical Med      acyclovir (ZOVIRAX) 800 MG Tab Take 800 mg by mouth 2 (two) times daily., Starting Sun 5/8/2022, Historical Med      azelastine (ASTELIN) 137 mcg (0.1 %) nasal spray USE 1 SPRAY(S) IN EACH NOSTRIL TWICE DAILY PRN, Historical Med      clindamycin (CLEOCIN T) 1 % external solution Apply topically., Starting Fri 8/13/2021, Historical Med      clobetasoL (TEMOVATE) 0.05 % external solution APPLY TO SCALP IN AREAS OF SCALP ITCHING 3 TIMES PER WEEK, CAN ALTERNATE WITH TAC OINTMENT, Historical Med      diclofenac (VOLTAREN) 50 MG EC tablet Take 1 tablet (50 mg total) by mouth 2 (two) times daily., Starting Wed 12/6/2023, Print      doxycycline monohydrate 100 mg Tab Take 1 tablet by mouth., Starting Thu 11/7/2024, Historical Med      famciclovir (FAMVIR) 250 MG Tab Take 250 mg by mouth 2 (two) times daily., Starting Sun 5/22/2022, Historical Med      fluticasone propionate (FLONASE) 50 mcg/actuation nasal spray USE 1 SPRAY(S) IN EACH NOSTRIL TWICE DAILY PRN, Historical Med      ibuprofen (ADVIL,MOTRIN) 800 MG tablet Take 800 mg by mouth every 6 to 8 hours as needed for Pain., Starting Fri 6/28/2024, Historical Med      imiquimod (ALDARA) 5 % cream Apply topically 3 (three) times a week., Starting Wed 9/23/2015,  Normal      linaCLOtide (LINZESS) 72 mcg Cap capsule TAKE 1 CAPSULE BY MOUTH ONCE DAILY 30 MINUTES PRIOR TO FIRST MEAL AS NEEDED FOR CONSTIPATION, Historical Med      ondansetron (ZOFRAN-ODT) 4 MG TbDL Take 1 tablet (4 mg total) by mouth every 6 (six) hours as needed., Starting Sun 9/3/2023, Print      pediatric multivit-iron-min (FLINTSTONES COMPLETE, IRON,) Chew Take 2 tablets by mouth., Historical Med      spironolactone (ALDACTONE) 50 MG tablet TAKE 1 TABLET BY MOUTH ONCE DAILY FOR 2 WEEKS THEN INCREASE TO 1 TABLET TWICE DAILY OR 2 TOGETHER AS TOLERATED, Historical Med      sucralfate (CARAFATE) 100 mg/mL suspension Take 1 g by mouth 4 (four) times daily., Historical Med      traMADoL (ULTRAM) 50 mg tablet Take 50 mg by mouth every 6 to 8 hours as needed for Pain., Starting Fri 6/28/2024, Historical Med      triamcinolone acetonide 0.1% (KENALOG) 0.1 % ointment Apply topically., Starting Tue 11/5/2024, Historical Med      TROKENDI XR 50 mg Cp24 Take 1 tablet by mouth daily as needed. , Starting 1/1/2015, Until Discontinued, Historical Med      valACYclovir (VALTREX) 1000 MG tablet Take 1,000 mg by mouth once daily., Starting Sun 5/22/2022, Historical Med                 Discharge Diagnosis: Sacroiliitis [M46.1]  Primary osteoarthritis of both hips [M16.0]  Condition on Discharge: Stable with no complications to procedure   Diet on Discharge: Same as before.  Activity: as per instruction sheet.  Discharge to: Home with a responsible adult.  Follow up: 2-4 weeks       Please call the office at [phone] if you experience any weakness or loss of sensation, fever > 101.5, pain uncontrolled with oral medications, persistent nausea/vomiting/or diarrhea, redness or drainage from the incisions, or any other worrisome concerns. If physician on call was not reached or could not communicate with our office for any reason please go to the nearest emergency department

## 2025-03-11 NOTE — DISCHARGE INSTRUCTIONS

## 2025-04-03 ENCOUNTER — OFFICE VISIT (OUTPATIENT)
Dept: PAIN MEDICINE | Facility: CLINIC | Age: 45
End: 2025-04-03
Payer: COMMERCIAL

## 2025-04-03 VITALS — BODY MASS INDEX: 29.35 KG/M2 | HEIGHT: 64 IN

## 2025-04-03 DIAGNOSIS — M47.816 LUMBAR SPONDYLOSIS: ICD-10-CM

## 2025-04-03 DIAGNOSIS — M16.0 PRIMARY OSTEOARTHRITIS OF BOTH HIPS: Primary | ICD-10-CM

## 2025-04-03 DIAGNOSIS — M25.552 BILATERAL HIP PAIN: ICD-10-CM

## 2025-04-03 DIAGNOSIS — M25.551 BILATERAL HIP PAIN: ICD-10-CM

## 2025-04-03 DIAGNOSIS — M46.1 SACROILIITIS: ICD-10-CM

## 2025-04-03 RX ORDER — DICLOFENAC SODIUM 50 MG/1
50 TABLET, DELAYED RELEASE ORAL
COMMUNITY

## 2025-04-03 RX ORDER — ACETAMINOPHEN 500 MG
1000 TABLET ORAL EVERY 8 HOURS PRN
Start: 2025-04-03

## 2025-04-03 NOTE — PROGRESS NOTES
Established Patient - TeleHealth Visit    The patient location is: [place]  The chief complaint leading to consultation is: chronic pain     Visit type: Audiovisual telemedicine visit     Total time spent on the encounter includes Face-to-face time and non-face to face time preparing to see the patient (eg, review of tests), Obtaining and/or reviewing separately obtained history, Documenting clinical information in the electronic or other health record, Independently interpreting results (not separately reported) and communicating results to the patient/family/caregiver, and/or Care coordination (not separately reported).     Each patient to whom he or she provides medical services by telemedicine is:  (1) informed of the relationship between the physician and patient and the respective role of any other health care provider with respect to management of the patient; and (2) notified that he or she may decline to receive medical services by telemedicine and may withdraw from such care at any time.      Chronic Pain -- Established Patient (Follow-up visit)    Referring Physician: self    PCP: No, Primary Doctor      Chief complaint:  Follow-up         SUBJECTIVE:    Interval History (4/3/2025): Thomas Naranjo presents today for follow-up visit.  she underwent bilateral IA hip + SIJ injection on 3/11/25.  The patient reports that she is/was better following the procedure.  she reports 80-90% pain relief.  The changes lasted 4 weeks so far.  The changes have continued through this visit.  Patient reports pain as 2/10 today.  She occasionally has flare ups where the pain can be up to 7/10.  The flare-ups happened less frequently than they did prior to the injection though.  She understands this is a chronic condition and does not expected to be gone completely.  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  She rarely takes ibuprofen because of the GI upset.   She does utilize Tylenol when needed.  When she has a migraine, she takes diclofenac, which is very helpful for her.    Initial HPI (2/13/2025):  Thomas Naranjo is a 45 y.o. female with past medical history significant for migraine headache, polycystic ovarian syndrome who presents to the clinic for the evaluation of lower back, hip and SI joint pain.  Patient reports pain has been present for several years.  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  Pain is constant and today is rated a 2/10.  Pain at its worse is a 10/10.  Patient reports she has been diagnosed with arthritis and sciatic nerve in the past.  Patient has been under the care of an orthopedic surgeon, chiropractor ([facility] in [place]), [facility] locally as well as been evaluated at [facility] in the past.  She reports pain in a bandlike distribution in the lower back which can radiate to the hips, SI joint territory as well as inguinal region.  Pain can be exacerbated when moving from sitting to standing with bending and lifting motions.  Pain is improved with diclofenac tablets, lying supine in alternating hot and cold.  Patient has received steroid injections which sound intramuscular nature every 3 months which gave her short-term relief.  Patient also reports history of receiving Brazilian butt lift in [place] with , which may have exacerbated her symptoms.  Today she denies more distal radiculopathy into the lower extremities or feet, weakness in the lower extremities.  Patient has also trialed tramadol and ibuprofen in the past with ineffective relief.  Patient denies night fever/night sweats, urinary incontinence, bowel incontinence, significant weight loss, significant motor weakness, and loss of sensations.      Pain Disability Index (PDI) Score Review:      2/13/2025    11:03 AM   Last 3 PDI Scores   Pain Disability Index  "(PDI) 40       Non-Pharmacologic Treatments:  Physical Therapy/Home Exercise: yes  Ice/Heat:yes  TENS: no  Acupuncture: no  Massage: yes  Chiropractic: yes    Other: no      Pain Medications:  - Opioids: Ultram (Tramadol HCL)  - Adjuvant Medications: Advil,Motrin ( Ibuprofen), Trokendi        Pain Procedures:   -3/11/25: bilateral IA hip + bilateral SIJ injection               Review of Systems:   GENERAL:  No weight loss, malaise or fevers.  HEENT:   No recent changes in vision or hearing  NECK:  Negative for lumps, no difficulty with swallowing.  RESPIRATORY:  Negative for cough, wheezing or shortness of breath, patient denies any recent URI.  CARDIOVASCULAR:  Negative for chest pain or palpitations.  GI:  Negative for abdominal discomfort, blood in stools or black stools or change in bowel habits.  MUSCULOSKELETAL:  See HPI.  SKIN:  Negative for lesions, rash, and itching.  PSYCH:  No mood disorder or recent psychosocial stressors.   HEMATOLOGY/LYMPHOLOGY:  Negative for prolonged bleeding, bruising easily or swollen nodes.    NEURO:   No history of syncope, paralysis, seizures or tremors.  All other reviewed and negative other than HPI.        OBJECTIVE:    Telemedicine Physical Exam:   Vitals:    04/03/25 1521   Height: 5' 4" (1.626 m)     Body mass index is 29.35 kg/m².   (reviewed on 4/3/2025)     (Physical exam performed virtually with patient guided on specific actions and diagnostic maneuvers)  GENERAL: Well appearing, in no acute distress, alert and oriented x3.  Cooperative.  PSYCH:  Mood and affect appropriate.  SKIN: Skin color & texture with no obvious abnormalities.    HEAD/FACE:  Normocephalic, atraumatic.    PULM:  No difficulty breathing. No nasal flaring. No obvious wheezing.  EXTREMITIES: No obvious deformities. Moving all extremities well, appears to have symmetric strength throughout.  MUSCULOSKELETAL: No obvious atrophy abnormalities are noted.   NEURO: No obvious neurologic deficit.   GAIT: " sitting.     Physical Exam: last in clinic visit:  GENERAL: Well appearing, in no acute distress, alert and oriented x3.  PSYCH:  Mood and affect appropriate.  SKIN: Skin color, texture, turgor normal, no rashes or lesions.  HEAD/FACE:  Normocephalic, atraumatic. Cranial nerves grossly intact.    CV: RRR with palpation of the radial artery.  PULM: No evidence of respiratory difficulty, symmetric chest rise.  GI:  Soft and non-tender.    BACK:  Exaggerated lumbar lordosis (history of BBL) Straight leg raising in the sitting and supine positions is negative to radicular pain. No pain to palpation over the facet joints of the lumbar spine or spinous processes. Normal range of motion without pain reproduction.  EXTREMITIES: Peripheral joint ROM is full and pain free without obvious instability or laxity in all four extremities. No deformities, edema, or skin discoloration. Good capillary refill.  MUSCULOSKELETAL: Able to stand on heels & toes.   Hip provocative maneuvers are + bilaterally.    SIJ testing: bilateral  - TTP over SI joint: Present  - Demetra's/ Bryson's: Positive    - Sacroiliac Distraction Test (anterior pressure): Positive  - Sacroiliac Compression Test (lateral pressure): Positive   - SacralThrust Test (posterior pressure): Positive       Facet loading test is negative bilaterally.   Bilateral upper and lower extremity strength is normal and symmetric.  No atrophy or tone abnormalities are noted.    RIGHT Lower extremity: Hip flexion 5/5, Hip Abduction 5/5, Hip Adduction 5/5, Knee extension 5/5, Knee flexion 5/5, Ankle dorsiflexion5/5, Extensor hallucis longus 5/5, Ankle plantarflexion 5/5  LEFT Lower extremity:  Hip flexion 5/5, Hip Abduction 5/5,Hip Adduction 5/5, Knee extension 5/5, Knee flexion 5/5, Ankle dorsiflexion 5/5, Extensor hallucis longus 5/5, Ankle plantarflexion 5/5  -Normal testing knee (patellar) jerk and ankle (achilles) jerk    NEURO: Bilateral upper and lower extremity coordination  and muscle stretch reflexes are physiologic and symmetric. No loss of sensation is noted.  GAIT: normal.            Imaging/ Diagnostic Studies/ Labs (Reviewed on 4/3/2025):    02/19/25 X-Ray Hips Bilateral 2 View Inc AP Pelvis  COMPARISON:  No studies are available for comparison.  FINDINGS: The hip joint spaces are preserved.  No articular erosions.  Alignment is satisfactory.      12/06/23 X-Ray Lumbar Spine Ap And Lateral  COMPARISON: None  FINDINGS:  There are 5 non-rib-bearing lumbar vertebra.  No fracture, compression deformity, disc space narrowing or spondylolisthesis.      03/31/22 X-Ray Cervical Spine AP And Lateral  COMPARISON: None.    FINDINGS:  Tip of the odontoid obscured by overlapping structures.  1.2 mm anterior subluxation C4-C5.  Otherwise alignment normal otherwise the alignment is normal.  All cervical vertebral bodies are normal in height.  Minimal disc space narrowing C4-5 and C5-6.  Prevertebral soft tissues are normal.        ASSESSMENT: 45 y.o. year old female with     1. Primary osteoarthritis of both hips        2. Bilateral hip pain        3. Sacroiliitis        4. Lumbar spondylosis  acetaminophen (TYLENOL) 500 MG tablet              PLAN:   - Interventions:  S/p bilateral IA hip + SIJ injection on 3/11/25 with 80-90% pain relief.    - Anticoagulation use: No no anticoagulation    - Medications:  -Continue Ibuprofen 800mg PRN pain. We have discussed potential deleterious side effects of NSAIDs on the cardiovascular, gastrointestinal and renal systems. We have discussed judicious use of this medication.  She takes sparingly due to GI issues.  -Continue diclofenac 50 mg PRN migraines, only takes occasionally.    -Can continue utilizing Tylenol (acetaminophen) 1000mg (two tablets of 500mg) 3x per day as needed for pain (to take up to max dose of 3000mg per day).     - [place]  reviewed and appropriate.      - Therapy:   We discussed continuing at home physician directed physical therapy to  help manage the patient/s painful condition. The patient was counseled that muscle strengthening will improve the long term prognosis in regards to pain and may also help increase range of motion and mobility.     - Imaging: Reviewed available imaging (x-ray lumbar spine) with patient and answered any questions they had regarding study.  X-ray bilateral hips to better evaluate pain - reviewed.    - Records:   - Records from Dr. Charis Hawthorne ([facility]) reviewed; patient attended one session in 2022.  - No records received from [facility] in [place], [place] or [facility].    - Follow up visit: 3 months follow-up - discuss repeat injection if needed - virtual visit       Future Appointments   Date Time Provider Department Center   7/10/2025  3:20 PM Beverly Del Angel PA-C [facility]         - Patient Questions: Answered all of the patient's questions regarding diagnosis, therapy, and treatment.    - This condition does not require this patient to take time off of work, and the primary goal of our Pain Management services is to improve the patient's functional capacity.   - I discussed the risks, benefits, and alternatives to potential treatment options. All questions and concerns were fully addressed today in clinic.         Beverly Del Angel PA-C  Interventional Pain Management - [facility]    Disclaimer:  This note was prepared using voice recognition system and is likely to have sound alike errors that may have been overlooked even after proof reading.  Please call me with any questions.

## 2025-04-08 DIAGNOSIS — M25.561 PAIN IN BOTH KNEES, UNSPECIFIED CHRONICITY: Primary | ICD-10-CM

## 2025-04-08 DIAGNOSIS — M25.562 PAIN IN BOTH KNEES, UNSPECIFIED CHRONICITY: Primary | ICD-10-CM

## 2025-04-09 ENCOUNTER — HOSPITAL ENCOUNTER (OUTPATIENT)
Dept: RADIOLOGY | Facility: HOSPITAL | Age: 45
Discharge: HOME | End: 2025-04-09
Attending: PHYSICIAN ASSISTANT
Payer: COMMERCIAL

## 2025-04-09 ENCOUNTER — OFFICE VISIT (OUTPATIENT)
Dept: ORTHOPEDICS | Facility: CLINIC | Age: 45
End: 2025-04-09
Payer: COMMERCIAL

## 2025-04-09 VITALS
HEART RATE: 69 BPM | BODY MASS INDEX: 29.2 KG/M2 | WEIGHT: 171.06 LBS | SYSTOLIC BLOOD PRESSURE: 104 MMHG | HEIGHT: 64 IN | DIASTOLIC BLOOD PRESSURE: 70 MMHG

## 2025-04-09 DIAGNOSIS — M25.362 PATELLAR INSTABILITY OF LEFT KNEE: Primary | ICD-10-CM

## 2025-04-09 DIAGNOSIS — M25.561 PAIN IN BOTH KNEES, UNSPECIFIED CHRONICITY: ICD-10-CM

## 2025-04-09 DIAGNOSIS — M25.562 PAIN IN BOTH KNEES, UNSPECIFIED CHRONICITY: ICD-10-CM

## 2025-04-09 PROCEDURE — 3074F SYST BP LT 130 MM HG: CPT | Mod: CPTII,S$GLB,, | Performed by: PHYSICIAN ASSISTANT

## 2025-04-09 PROCEDURE — 3008F BODY MASS INDEX DOCD: CPT | Mod: CPTII,S$GLB,, | Performed by: PHYSICIAN ASSISTANT

## 2025-04-09 PROCEDURE — 99204 OFFICE O/P NEW MOD 45 MIN: CPT | Mod: S$GLB,,, | Performed by: PHYSICIAN ASSISTANT

## 2025-04-09 PROCEDURE — 73564 X-RAY EXAM KNEE 4 OR MORE: CPT | Mod: 26,50,, | Performed by: RADIOLOGY

## 2025-04-09 PROCEDURE — 99999 PR PBB SHADOW E&M-EST. PATIENT-LVL V: CPT | Mod: PBBFAC,,, | Performed by: PHYSICIAN ASSISTANT

## 2025-04-09 PROCEDURE — 1160F RVW MEDS BY RX/DR IN RCRD: CPT | Mod: CPTII,S$GLB,, | Performed by: PHYSICIAN ASSISTANT

## 2025-04-09 PROCEDURE — 1159F MED LIST DOCD IN RCRD: CPT | Mod: CPTII,S$GLB,, | Performed by: PHYSICIAN ASSISTANT

## 2025-04-09 PROCEDURE — 3078F DIAST BP <80 MM HG: CPT | Mod: CPTII,S$GLB,, | Performed by: PHYSICIAN ASSISTANT

## 2025-04-09 PROCEDURE — 73564 X-RAY EXAM KNEE 4 OR MORE: CPT | Mod: TC,50

## 2025-04-09 RX ORDER — DICLOFENAC SODIUM 50 MG/1
50 TABLET, DELAYED RELEASE ORAL 2 TIMES DAILY
Qty: 60 TABLET | Refills: 0 | Status: SHIPPED | OUTPATIENT
Start: 2025-04-09 | End: 2025-05-09

## 2025-04-09 RX ORDER — PHENIRAMINE MALEATE, PHENYLEPHRINE HCL 17.5; 1 MG/1; MG/1
1 TABLET ORAL EVERY 4 HOURS
COMMUNITY
Start: 2024-12-27

## 2025-04-09 RX ORDER — METHOCARBAMOL 500 MG/1
500 TABLET, FILM COATED ORAL 3 TIMES DAILY
Qty: 21 TABLET | Refills: 0 | Status: SHIPPED | OUTPATIENT
Start: 2025-04-09 | End: 2025-04-16

## 2025-04-09 RX ORDER — FLUCONAZOLE 150 MG/1
150 TABLET ORAL DAILY PRN
COMMUNITY
Start: 2025-03-28

## 2025-04-09 RX ORDER — ALUMINUM CHLORIDE 20 %
SOLUTION, NON-ORAL TOPICAL SEE ADMIN INSTRUCTIONS
COMMUNITY
Start: 2025-02-28

## 2025-04-09 RX ORDER — ONDANSETRON 8 MG/1
8 TABLET, ORALLY DISINTEGRATING ORAL EVERY 8 HOURS PRN
COMMUNITY
Start: 2025-03-28 | End: 2025-04-17

## 2025-04-09 RX ORDER — CHLORHEXIDINE GLUCONATE ORAL RINSE 1.2 MG/ML
SOLUTION DENTAL 2 TIMES DAILY
COMMUNITY
Start: 2025-04-01

## 2025-04-12 ENCOUNTER — CLINICAL SUPPORT (OUTPATIENT)
Dept: REHABILITATION | Facility: HOSPITAL | Age: 45
End: 2025-04-12
Payer: COMMERCIAL

## 2025-04-12 DIAGNOSIS — M25.362 PATELLAR INSTABILITY OF LEFT KNEE: ICD-10-CM

## 2025-04-12 PROCEDURE — 97112 NEUROMUSCULAR REEDUCATION: CPT | Mod: PN

## 2025-04-12 PROCEDURE — 97161 PT EVAL LOW COMPLEX 20 MIN: CPT | Mod: PN

## 2025-04-12 NOTE — PROGRESS NOTES
Outpatient Rehab    Physical Therapy Evaluation    Date: 4/12/2025     Clinic Number: [id number]    Patient Name: Thomas Naranjo  MRN: [id number]  YOB: 1980  Today's Date: 4/12/2025    Therapy Diagnosis:   Encounter Diagnosis   Name Primary?    Patellar instability of left knee        Physician: Alex Morales PA-C    Physician Orders: Eval and Treat  Medical Diagnosis: M25.362 (ICD-10-CM) - Patellar instability of left knee    Visit # / Visits Authorized:  1 / 1   Date of Evaluation:  4/12/2025   Insurance Authorization Period: 04/11/2025 to 04/10/2027  Plan of Care Certification:  4/12/2025 to 5/24/2025      Time In:  1030  Time Out:  1130  Total Billable Time: 45 min      SUBJECTIVE     Date of onset: about a week ago.     History of current condition - Thomas reports she has started having significant pain and swelling in her knee since last week. She denies any mechanism of injury. She states she he has been doing the RICE method for management. She states her knee feels like it is giving out on her frequently. She returned to work yesterday and feels like it was manageable but she is now feeling throbbing and aching pain after.     Falls: N/A    Imaging: [x] Xray [] MRI [] CT: Performed on: 4/9/25 FINDINGS:  There is minimal marginal osteophyte formation seen involving the medial compartment of either knee.  The joint spaces of all 3 compartments of both knees appear to be relatively well maintained.  No joint effusions are suggested.  No acute fracture or dislocation.       Pain:  Current 5/10, worst 10/10   Location: [] Right   [x] Left:  knee  Description: aching  and throbbing  Aggravating Factors: walking, moving knee  Easing Factors: activity avoidance, rest, medication    Prior Therapy:   [] N/A    [] Yes:   Social History: Pt lives with their family  Occupation: Pt is a registered nurse at [employer].  Prior Level of Function: Independent and pain free with all ADL, IADL, community mobility  and functional activities.   Current Level of Function: Independent with all ADL, IADL, community mobility and functional activities with reports of increased pain and need for increased time and frequent breaks.      Dominant Extremity:    [] Right    [] Left    Pts goals: Pt reported goals are to decrease overall pain levels in order to return to prior functional level.     Medical History:   Past Medical History:   Diagnosis Date    Allergy     Anemia, unspecified     Central centrifugal cicatricial alopecia     Herpes genitalia     Migraine headache     PCOS (polycystic ovarian syndrome)     Shingles        Surgical History:   Thomas Naranjo  has a past surgical history that includes Tonsillectomy; Foot surgery (Bilateral, 2004); Adenoidectomy; Reduction of both breasts (Bilateral, 02/13/2023); Fat transfer (09/2023); Total Reduction Mammoplasty; Colonoscopy (08/2020); Upper gastrointestinal endoscopy (2021); Bariatric Surgery (2021); and Injection of anesthetic agent into sacroiliac joint (Bilateral, 3/11/2025).    Medications:   Thomas has a current medication list which includes the following prescription(s): acetaminophen, acyclovir, alahist d, azelastine, chlorhexidine, clindamycin, clobetasol, diclofenac, doxycycline monohydrate, drysol dab-o-matic, famciclovir, fluconazole, fluticasone propionate, ibuprofen, imiquimod, linaclotide, methocarbamol, ondansetron, ondansetron, pantoprazole, flintstones complete (iron), semaglutide (weight loss), spironolactone, sucralfate, tramadol, triamcinolone acetonide 0.1%, trokendi xr, and valacyclovir.    Allergies:   Review of patient's allergies indicates:   Allergen Reactions    Fioricet [butalbital-acetaminophen-caff] Other (See Comments)    Latex      Contact Dermatitis        Objective        Range of Motion:   Knee Left Right   Passive 0-135 0-135   Active 0-125 0-125       Lower Extremity Strength  Left LE  Right LE    Knee extension: 4/5 Knee extension: 5/5    Knee flexion: 4+/5 Knee flexion: 5/5   Hip extension:  4/5 Hip extension: 4+/5   Hip abduction: 4-/5 Hip abduction: 4/5     Special Tests:   Left Right   Valgus Stress Test - -   Varus Stress test - -   Lachman's test - -   Elfego's Test - -     Function:  - Gait Patient ambulates with normal gait mechanics.   - Squat: Decreased squat depth, offloading to R LE       CMS Impairment/Limitation/Restriction for FOTO Knee Survey    Therapist reviewed FOTO scores for Thomas on 4/12/2025.   FOTO documents entered into [facility] - see Media section.    Functional Score: 34% (Predicted 65%         Treatment     CPT Intervention  JointFocus Duration / Intensity  4/12   MT Manual  -    NMR Quad Set 20x5s   NMR Bridges c' TB 2x10 RTB    NMR Sidelying Clamshell  2x10    NMR Prone Quad Str 5x30s           PLAN           Total Treatment time (time-based codes) separate from Evaluation: (20) minutes     CPT Codes available for Billing:   (-) minutes of Manual therapy (MT) to improve pain and ROM.  (-) minutes of Therapeutic Exercise (TE) to develop strength, endurance, range of motion, and flexibility.  (20) minutes of Neuromuscular Re-Education (NMR)  to improve: Balance, Coordination, Kinesthetic, Sense, Proprioception, and Posture.  (-) minutes of Therapeutic Activities (TA) to improve functional performance.  (-) Unattended Electrical Stimulation (ES) for muscle performance or pain modulation.  (-) Vasopneumatic Device Therapy () for management of swelling/edema. (01487)  (-) BFR: Blood flow restriction applied during exercise  NP or (-): Not Performed      Patient Education and Home Exercises     Education provided:  PURPOSE: Patient educated on the impairments noted above and the effects of physical therapy intervention to improve overall condition and QOL.   EXERCISE: Patient was educated on all the above exercise prior/during/after for proper posture, positioning, and execution for safe performance with home exercise  program.   STRENGTH: Patient educated on the importance of improved core and extremity strength in order to improve alignment of the spine and extremities with static positions and dynamic movement.     Written Home Exercises Provided: yes.  Exercises were reviewed and Thomas was able to demonstrate them prior to the end of the session.  Thomas demonstrated good  understanding of the education provided. See EMR under Patient Instructions for exercises provided during therapy sessions.    ASSESSMENT     Thomas is a 45 y.o. female referred to outpatient Physical Therapy with a medical diagnosis of M25.362 (ICD-10-CM) - Patellar instability of left knee. Pt presents with impairments in the following categories: IMPAIRMENTS: ROM, strength, joint mobility, balance, and functional movement patterns.     Pt prognosis is Excellent  Pt will benefit from skilled outpatient Physical Therapy to address the deficits stated above and in the chart below, provide pt/family education, and to maximize pt's level of independence.     Plan of care discussed with patient: Yes  Pt's spiritual, cultural and educational needs considered and patient is agreeable to the plan of care and goals as stated below:     Anticipated Barriers for therapy: none    Medical Necessity is demonstrated by the following  History  Co-morbidities and personal factors that may impact the plan of care [x] LOW: no personal factors / co-morbidities  [] MODERATE: 1-2 personal factors / co-morbidities  [] HIGH: 3+ personal factors / co-morbidities    Moderate / High Support Documentation:   Past Medical History:   Diagnosis Date    Allergy     Anemia, unspecified     Central centrifugal cicatricial alopecia     Herpes genitalia     Migraine headache     PCOS (polycystic ovarian syndrome)     Shingles         Examination  Body Structures and Functions, activity limitations and participation restrictions that may impact the plan of care [x] LOW: addressing 1-2  "elements  [] MODERATE: 3+ elements  [] HIGH: 4+ elements (please support below)    Moderate / High Support Documentation: See above in "Current Level of Function"      Clinical Presentation [x] LOW: stable  [] MODERATE: Evolving  [] HIGH: Unstable     Decision Making/ Complexity Score: low       Plan of care Certification: 4/12/2025 to 5/24/2025.    Outpatient Physical Therapy 2 times weekly for 6 weeks to include any combination of the following interventions: virtual visits, dry needling, modalities, electrical stimulation (IFC, Pre-Mod, Attended with Functional Dry Needling), Cervical/Lumbar Traction, Electrical Stimulation  , Manual Therapy, Moist Heat/ Ice, Neuromuscular Re-ed, Patient Education, Self Care, Therapeutic Exercise, and Therapeutic Activites     GOALS:     Short Term Goals:  3 weeks Status  Date Met   PAIN: Pt will report worst pain of 7/10 in order to progress toward max functional ability and improve quality of life. [x] Progressing  [] Met  [] Not Met    FUNCTION: Patient will demonstrate improved function as indicated by a functional score of greater than or equal to 50% predicted on FOTO. [x] Progressing  [] Met  [] Not Met    STRENGTH: Patient will improve strength to 50% of stated goals, listed in objective measures above, in order to progress towards independence with functional activities. [x] Progressing  [] Met  [] Not Met    HEP: Patient will demonstrate independence with HEP in order to progress toward functional independence. [x] Progressing  [] Met  [] Not Met      Long Term Goals:  6 weeks Status Date Met   PAIN: Pt will report worst pain of 4/10 in order to progress toward max functional ability and improve quality of life [x] Progressing  [] Met  [] Not Met    FUNCTION: Patient will demonstrate improved function as indicated by a functional score of greater than or equal to predicted score on FOTO. [x] Progressing  [] Met  [] Not Met    STRENGTH: Patient will improve strength to " stated goals, listed in objective measures above, in order to improve functional independence and quality of life.  [x] Progressing  [] Met  [] Not Met    Patient will return to normal ADL's, IADL's, community involvement, recreational activities, and work-related activities with less than or equal to 4/10 pain and maximal function.  [x] Progressing  [] Met  [] Not Met          Gurpreet Johnson, PT, DPT

## 2025-05-08 ENCOUNTER — PATIENT MESSAGE (OUTPATIENT)
Dept: RESEARCH | Facility: HOSPITAL | Age: 45
End: 2025-05-08
Payer: COMMERCIAL

## 2025-07-10 ENCOUNTER — OFFICE VISIT (OUTPATIENT)
Dept: PAIN MEDICINE | Facility: CLINIC | Age: 45
End: 2025-07-10
Payer: COMMERCIAL

## 2025-07-10 VITALS — BODY MASS INDEX: 29.8 KG/M2 | HEIGHT: 64 IN

## 2025-07-10 DIAGNOSIS — M25.551 BILATERAL HIP PAIN: ICD-10-CM

## 2025-07-10 DIAGNOSIS — M54.2 NECK PAIN ON RIGHT SIDE: Primary | ICD-10-CM

## 2025-07-10 DIAGNOSIS — M54.12 CERVICAL RADICULAR PAIN: ICD-10-CM

## 2025-07-10 DIAGNOSIS — M25.552 BILATERAL HIP PAIN: ICD-10-CM

## 2025-07-10 DIAGNOSIS — M46.1 SACROILIITIS: ICD-10-CM

## 2025-07-10 DIAGNOSIS — M62.830 SPASM OF RIGHT TRAPEZIUS MUSCLE: ICD-10-CM

## 2025-07-10 DIAGNOSIS — M50.30 DDD (DEGENERATIVE DISC DISEASE), CERVICAL: ICD-10-CM

## 2025-07-10 RX ORDER — METHOCARBAMOL 500 MG/1
500 TABLET, FILM COATED ORAL 2 TIMES DAILY PRN
Qty: 60 TABLET | Refills: 1 | Status: SHIPPED | OUTPATIENT
Start: 2025-07-10 | End: 2025-07-10 | Stop reason: SDUPTHER

## 2025-07-10 RX ORDER — METHYLPREDNISOLONE 4 MG/1
TABLET ORAL
Qty: 21 EACH | Refills: 0 | Status: SHIPPED | OUTPATIENT
Start: 2025-07-10

## 2025-07-10 RX ORDER — KETOROLAC TROMETHAMINE 10 MG/1
10 TABLET, FILM COATED ORAL 2 TIMES DAILY PRN
Qty: 8 TABLET | Refills: 0 | Status: SHIPPED | OUTPATIENT
Start: 2025-07-10 | End: 2025-07-10

## 2025-07-10 RX ORDER — KETOROLAC TROMETHAMINE 10 MG/1
10 TABLET, FILM COATED ORAL 2 TIMES DAILY PRN
Qty: 8 TABLET | Refills: 0 | Status: SHIPPED | OUTPATIENT
Start: 2025-07-10

## 2025-07-10 RX ORDER — METHOCARBAMOL 500 MG/1
500 TABLET, FILM COATED ORAL 2 TIMES DAILY PRN
Qty: 60 TABLET | Refills: 1 | Status: SHIPPED | OUTPATIENT
Start: 2025-07-10

## 2025-07-10 RX ORDER — METHYLPREDNISOLONE 4 MG/1
TABLET ORAL
Qty: 21 EACH | Refills: 0 | Status: SHIPPED | OUTPATIENT
Start: 2025-07-10 | End: 2025-07-10

## 2025-07-10 NOTE — PATIENT INSTRUCTIONS
- Would also recommend trying capsicum patches, which could also provide additional pain relief.

## 2025-07-10 NOTE — PROGRESS NOTES
Established Patient - TeleHealth Visit    The patient location is: [place]  The chief complaint leading to consultation is: chronic pain     Visit type: Audiovisual telemedicine visit     Total time spent on the encounter includes Face-to-face time and non-face to face time preparing to see the patient (eg, review of tests), Obtaining and/or reviewing separately obtained history, Documenting clinical information in the electronic or other health record, Independently interpreting results (not separately reported) and communicating results to the patient/family/caregiver, and/or Care coordination (not separately reported).     Each patient to whom he or she provides medical services by telemedicine is:  (1) informed of the relationship between the physician and patient and the respective role of any other health care provider with respect to management of the patient; and (2) notified that he or she may decline to receive medical services by telemedicine and may withdraw from such care at any time.      Chronic Pain -- Established Patient (Follow-up visit)    Referring Physician: self    PCP: No, Primary Doctor      Chief complaint:  Follow-up     New pain: Right upper shoulder and back pain for approximately one week  SIJ / hip pain        SUBJECTIVE:    Interval History (7/10/2025): Patient presents for follow-up of previously treated SI and hip pain; she had an injection in March 2025, which provided 80-90% relief. She reports occasional brief episodes. Three weeks ago, she developed new pain in the upper right shoulder, describing it as severe and aching. Pain is located in the back near the shoulder blade, which appears swollen, and affects her neck. This new pain has been present for about a week and has worsened. She has been managing the pain with alternating hot and moist heat, ice packs, and NSAIDs. She took her last Flexeril, an old prescription from when she thought she had sciatica. She reports difficulty  determining the effectiveness of individual treatments due to using multiple interventions simultaneously. She has created her own topical pain relief mixture, combining Voltaren, BioFreeze, CBD oil, Bengay, arnica, and a cryotherapy spray, which provide temporary relief but do not last long.    Patient has a history of breast reduction and weight loss surgery. 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     She reports three severe episodes in the past, with the most recent occurring about three weeks ago, causing significant back and buttock pain. She has been diagnosed with sacroiliitis rather than sciatica, which she initially suspected.    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 She mentions using proper body mechanics during transfers. Patient reports feeling weaker since her weight loss surgery, which affects her ability to perform job duties. She recently assisted in transferring a patient weighing over 400 lbs, which may have contributed to her current symptoms. She is trying to move to a different dept.    Interval History (4/3/2025): Thomas Naranjo presents today for follow-up visit.  she underwent bilateral IA hip + SIJ injection on 3/11/25.  The patient reports that she is/was better following the procedure.  she reports 80-90% pain relief.  The changes lasted 4 weeks so far.  The changes have continued through this visit.  Patient reports pain as 2/10 today.  She occasionally has flare ups where the pain can be up to 7/10.  The flare-ups happened less frequently than they did prior to the injection though.  She understands this is a chronic condition and does not expected to be gone completely.  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  She rarely takes  ibuprofen because of the GI upset.  She does utilize Tylenol when needed.  When she has a migraine, she takes diclofenac, which is very helpful for her.    Initial HPI (2/13/2025):  Thomas Naranjo is a 45 y.o. female with past medical history significant for migraine headache, polycystic ovarian syndrome who presents to the clinic for the evaluation of lower back, hip and SI joint pain.  Patient reports pain has been present for several years.  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  Pain is constant and today is rated a 2/10.  Pain at its worse is a 10/10.  Patient reports she has been diagnosed with arthritis and sciatic nerve in the past.  Patient has been under the care of an orthopedic surgeon, chiropractor ([facility] in [place]), [facility] locally as well as been evaluated at [facility] in the past.  She reports pain in a bandlike distribution in the lower back which can radiate to the hips, SI joint territory as well as inguinal region.  Pain can be exacerbated when moving from sitting to standing with bending and lifting motions.  Pain is improved with diclofenac tablets, lying supine in alternating hot and cold.  Patient has received steroid injections which sound intramuscular nature every 3 months which gave her short-term relief.  Patient also reports history of receiving Brazilian butt lift in [place] with , which may have exacerbated her symptoms.  Today she denies more distal radiculopathy into the lower extremities or feet, weakness in the lower extremities.  Patient has also trialed tramadol and ibuprofen in the past with ineffective relief.  Patient denies night fever/night sweats, urinary incontinence, bowel incontinence, significant weight loss, significant motor weakness, and loss of sensations.      Pain Disability Index (PDI) Score Review:      2/13/2025    11:03 AM   Last  "3 PDI Scores   Pain Disability Index (PDI) 40       Non-Pharmacologic Treatments:  Physical Therapy/Home Exercise: yes  Ice/Heat:yes  TENS: no  Acupuncture: no  Massage: yes  Chiropractic: yes    Other: no      Pain Medications:  - Opioids: Ultram (Tramadol HCL)  - Adjuvant Medications: Advil,Motrin ( Ibuprofen), Trokendi        Pain Procedures:   -3/11/25: bilateral IA hip + bilateral SIJ injection               Review of Systems:   GENERAL:  No weight loss, malaise or fevers.  HEENT:   No recent changes in vision or hearing  NECK:  Negative for lumps, no difficulty with swallowing.  RESPIRATORY:  Negative for cough, wheezing or shortness of breath, patient denies any recent URI.  CARDIOVASCULAR:  Negative for chest pain or palpitations.  GI:  Negative for abdominal discomfort, blood in stools or black stools or change in bowel habits.  MUSCULOSKELETAL:  See HPI.  SKIN:  Negative for lesions, rash, and itching.  PSYCH:  No mood disorder or recent psychosocial stressors.   HEMATOLOGY/LYMPHOLOGY:  Negative for prolonged bleeding, bruising easily or swollen nodes.    NEURO:   No history of syncope, paralysis, seizures or tremors.  All other reviewed and negative other than HPI.        OBJECTIVE:    Telemedicine Physical Exam:   Vitals:    07/10/25 1523   Height: 5' 4" (1.626 m)     Body mass index is 29.8 kg/m².   (reviewed on 7/10/2025)     (Physical exam performed virtually with patient guided on specific actions and diagnostic maneuvers)  GENERAL: Well appearing, in no acute distress, alert and oriented x3.  Cooperative.  PSYCH:  Mood and affect appropriate.  SKIN: Skin color & texture with no obvious abnormalities.    HEAD/FACE:  Normocephalic, atraumatic.    PULM:  No difficulty breathing. No nasal flaring. No obvious wheezing.  EXTREMITIES: No obvious deformities. Moving all extremities well, appears to have symmetric strength throughout.  MUSCULOSKELETAL: No obvious atrophy abnormalities are noted.   GAIT: " sitting.     Physical Exam: last in clinic visit:  GENERAL: Well appearing, in no acute distress, alert and oriented x3.  PSYCH:  Mood and affect appropriate.  SKIN: Skin color, texture, turgor normal, no rashes or lesions.  HEAD/FACE:  Normocephalic, atraumatic. Cranial nerves grossly intact.    CV: RRR with palpation of the radial artery.  PULM: No evidence of respiratory difficulty, symmetric chest rise.  GI:  Soft and non-tender.    BACK:  Exaggerated lumbar lordosis (history of BBL) Straight leg raising in the sitting and supine positions is negative to radicular pain. No pain to palpation over the facet joints of the lumbar spine or spinous processes. Normal range of motion without pain reproduction.  EXTREMITIES: Peripheral joint ROM is full and pain free without obvious instability or laxity in all four extremities. No deformities, edema, or skin discoloration. Good capillary refill.  MUSCULOSKELETAL: Able to stand on heels & toes.   Hip provocative maneuvers are + bilaterally.    SIJ testing: bilateral  - TTP over SI joint: Present  - Demetra's/ Bryson's: Positive    - Sacroiliac Distraction Test (anterior pressure): Positive  - Sacroiliac Compression Test (lateral pressure): Positive   - SacralThrust Test (posterior pressure): Positive       Facet loading test is negative bilaterally.   Bilateral upper and lower extremity strength is normal and symmetric.  No atrophy or tone abnormalities are noted.    RIGHT Lower extremity: Hip flexion 5/5, Hip Abduction 5/5, Hip Adduction 5/5, Knee extension 5/5, Knee flexion 5/5, Ankle dorsiflexion5/5, Extensor hallucis longus 5/5, Ankle plantarflexion 5/5  LEFT Lower extremity:  Hip flexion 5/5, Hip Abduction 5/5,Hip Adduction 5/5, Knee extension 5/5, Knee flexion 5/5, Ankle dorsiflexion 5/5, Extensor hallucis longus 5/5, Ankle plantarflexion 5/5  -Normal testing knee (patellar) jerk and ankle (achilles) jerk    NEURO: Bilateral upper and lower extremity coordination  and muscle stretch reflexes are physiologic and symmetric. No loss of sensation is noted.  GAIT: normal.            Imaging/ Diagnostic Studies/ Labs (Reviewed on 7/10/2025):    02/19/25 X-Ray Hips Bilateral 2 View Inc AP Pelvis  COMPARISON:  No studies are available for comparison.  FINDINGS: The hip joint spaces are preserved.  No articular erosions.  Alignment is satisfactory.      12/06/23 X-Ray Lumbar Spine Ap And Lateral  COMPARISON: None  FINDINGS:  There are 5 non-rib-bearing lumbar vertebra.  No fracture, compression deformity, disc space narrowing or spondylolisthesis.      03/31/22 X-Ray Cervical Spine AP And Lateral  COMPARISON: None.  FINDINGS:  Tip of the odontoid obscured by overlapping structures.  1.2 mm anterior subluxation C4-C5.  Otherwise alignment normal otherwise the alignment is normal.  All cervical vertebral bodies are normal in height.  Minimal disc space narrowing C4-5 and C5-6.  Prevertebral soft tissues are normal.        ASSESSMENT: 45 y.o. year old female with     1. Neck pain on right side        2. Spasm of right trapezius muscle  methocarbamoL (ROBAXIN) 500 MG Tab    DISCONTINUED: methocarbamoL (ROBAXIN) 500 MG Tab      3. Cervical radicular pain, into right shoulder/ trapezius  ketorolac (TORADOL) 10 mg tablet    methylPREDNISolone (MEDROL DOSEPACK) 4 mg tablet    DISCONTINUED: methylPREDNISolone (MEDROL DOSEPACK) 4 mg tablet    DISCONTINUED: ketorolac (TORADOL) 10 mg tablet      4. DDD (degenerative disc disease), cervical  HME - OTHER      5. Bilateral hip pain        6. Sacroiliitis             PLAN:   - Interventions:    - Discussed cervical potential trigger point injections if current treatment plan does not provide relief. Contact the office if symptoms do not calm down to consider cervical trigger point injections.   Patient has tried acupuncture in the past, but the effectiveness was not specified.  - S/p bilateral IA hip + SIJ injection on 3/11/25 with 80-90% pain  relief.   Consider repeating hip and SIJ injection if needed.    - Anticoagulation use: No no anticoagulation    - Medications:  - Will give short term Rx of ketorolac (TORADOL) 10 mg BID PRN x 4 days. Take with food.  Informed to avoid any other NSAIDs while taking this medication.  Discontinue ibuprofen while taking Toradol.    - Will prescribe Medrol Dose pack with instructions - for acute pain flare:  Day 1: 2 tablets before breakfast, 1 tablet after lunch, 1 tablet after dinner, 2 tablets at bedtime   Day 2: 1 tablet before breakfast, 1 tablet after lunch, 1 tablet after dinner, 2 tablets at bedtime   Day 3: 1 tablet before breakfast, 1 tablet after lunch, 1 tablet after dinner, 1 tablet at bedtime   Day 4: 1 tablet before breakfast, 1 tablet after lunch, 1 tablet at bedtime   Day 5: 1 tablet before breakfast, 1 tablet at bedtime   Day 6: 1 tablet before breakfast.    - Start Robaxin (methocarbamol) 500mg BID PRN muscle spasms (or can take 1/2 tablet).  she understands this could cause drowsiness. Other common potential deleterious side effects associated with this medication including dizziness, drowsiness, dry mouth, or tingling sensation in the upper or lower extremities.      - Would also recommend trying OTC Salon Pas heated capsicum patches, which could also provide additional pain relief.    - Continue use of her own DIY topical pain relief mixture, combining Voltaren, BioFreeze, CBD oil, Bengay, arnica, and a cryotherapy spray.    -Continue Ibuprofen 800mg PRN pain. We have discussed potential deleterious side effects of NSAIDs on the cardiovascular, gastrointestinal and renal systems. We have discussed judicious use of this medication.  She takes sparingly due to GI issues and does not take with diclofenac.     -Continue diclofenac 50 mg PRN migraines, only takes occasionally.      -Can continue utilizing OTC Tylenol (acetaminophen) 1000mg (two tablets of 500mg) 3x per day as needed for pain (to take  up to max dose of 3000mg per day).     - [place]  reviewed and appropriate.      - Therapy:   - Order TENs unit to help increase ROM, reduce pain, strengthen, and allow return to ADLs.  The patient will be instructed on its use by the [employer] ([facility]) representatives. The patient was counseled that this may help treat their myofascial pain through transcutaneous electrical nerve stimulation and excitation via an electric current.  We have discussed that the unit is usually connected to the skin using two or more electrodes, which are typically conductive gel pads.  A typical battery-operated TENS unit is able to modulate pulse width, frequency, and intensity to help reduce pain. Order sent to [facility].    - We discussed continuing at home physician directed physical therapy to help manage the patient/s painful condition. The patient was counseled that muscle strengthening will improve the long term prognosis in regards to pain and may also help increase range of motion and mobility.     - Imaging: Reviewed available imaging (x-ray lumbar spine, bilateral hip x-rays) with patient and answered any questions they had regarding study.      - Records:   - Records from Dr. Charis Hawthorne ([facility]) reviewed; patient attended one session in 2022.  - No records received from [facility] in [place], [place] or [facility].    - Other:  - Work note provided for today.    - Follow up visit: 3 months follow-up - discuss repeat injection if needed - virtual visit       No future appointments.    - Extensive direct patient care was provided during this telemedicine visit. Over 50% of the time was spent counseling/educating the patient. Prolonged duration of time was spent on patient care including prep time reviewing the chart before the visit, time spent with patient during the visit, and the time spent after the visit reviewing studies, documenting note, obtaining information from  collaborative providers, etc.    - Patient Questions: Answered all of the patient's questions regarding diagnosis, therapy, and treatment.    - This condition does not require this patient to take time off of work, and the primary goal of our Pain Management services is to improve the patient's functional capacity.   - I discussed the risks, benefits, and alternatives to potential treatment options. All questions and concerns were fully addressed today in clinic.         Beverly Del Angel PA-C  Interventional Pain Management - [facility]    Disclaimer:  This note was prepared using voice recognition system and is likely to have sound alike errors that may have been overlooked even after proof reading.  Please call me with any questions.     This note was generated with the assistance of ambient listening technology to support clinical documentation. The content has been reviewed and edited for accuracy by the author, though minor syntax or spelling errors may remain. Please contact the author of this note for any clarification.

## 2025-08-27 ENCOUNTER — PATIENT MESSAGE (OUTPATIENT)
Dept: PAIN MEDICINE | Facility: CLINIC | Age: 45
End: 2025-08-27
Payer: COMMERCIAL

## 2025-08-28 ENCOUNTER — TELEPHONE (OUTPATIENT)
Dept: PAIN MEDICINE | Facility: CLINIC | Age: 45
End: 2025-08-28
Payer: COMMERCIAL

## 2025-08-29 ENCOUNTER — OFFICE VISIT (OUTPATIENT)
Dept: PAIN MEDICINE | Facility: CLINIC | Age: 45
End: 2025-08-29
Payer: COMMERCIAL

## 2025-08-29 VITALS — HEIGHT: 64 IN | BODY MASS INDEX: 29.8 KG/M2

## 2025-08-29 DIAGNOSIS — M25.551 BILATERAL HIP PAIN: Primary | ICD-10-CM

## 2025-08-29 DIAGNOSIS — M46.1 SACROILIITIS: ICD-10-CM

## 2025-08-29 DIAGNOSIS — G89.29 CHRONIC RIGHT SHOULDER PAIN: ICD-10-CM

## 2025-08-29 DIAGNOSIS — M54.2 NECK PAIN ON RIGHT SIDE: ICD-10-CM

## 2025-08-29 DIAGNOSIS — M54.12 CERVICAL RADICULAR PAIN: ICD-10-CM

## 2025-08-29 DIAGNOSIS — M25.552 BILATERAL HIP PAIN: Primary | ICD-10-CM

## 2025-08-29 DIAGNOSIS — M16.0 PRIMARY OSTEOARTHRITIS OF BOTH HIPS: ICD-10-CM

## 2025-08-29 DIAGNOSIS — M25.511 CHRONIC RIGHT SHOULDER PAIN: ICD-10-CM

## 2025-08-29 RX ORDER — CELECOXIB 100 MG/1
100 CAPSULE ORAL 2 TIMES DAILY PRN
Qty: 60 CAPSULE | Refills: 0 | Status: SHIPPED | OUTPATIENT
Start: 2025-08-29

## 2025-09-02 ENCOUNTER — TELEPHONE (OUTPATIENT)
Dept: PAIN MEDICINE | Facility: CLINIC | Age: 45
End: 2025-09-02
Payer: COMMERCIAL

## 2025-09-03 ENCOUNTER — TELEPHONE (OUTPATIENT)
Dept: PAIN MEDICINE | Facility: CLINIC | Age: 45
End: 2025-09-03
Payer: COMMERCIAL